# Patient Record
Sex: FEMALE | Race: OTHER | Employment: FULL TIME | URBAN - METROPOLITAN AREA
[De-identification: names, ages, dates, MRNs, and addresses within clinical notes are randomized per-mention and may not be internally consistent; named-entity substitution may affect disease eponyms.]

---

## 2017-11-16 ENCOUNTER — HOSPITAL ENCOUNTER (EMERGENCY)
Age: 47
Discharge: HOME | End: 2017-11-16
Attending: EMERGENCY MEDICINE | Admitting: EMERGENCY MEDICINE
Payer: SELF-PAY

## 2017-11-16 VITALS
TEMPERATURE: 98.3 F | DIASTOLIC BLOOD PRESSURE: 114 MMHG | RESPIRATION RATE: 16 BRPM | OXYGEN SATURATION: 100 % | BODY MASS INDEX: 30.73 KG/M2 | SYSTOLIC BLOOD PRESSURE: 167 MMHG | WEIGHT: 168 LBS | HEART RATE: 66 BPM

## 2017-11-16 DIAGNOSIS — R03.0 ELEVATED BLOOD PRESSURE READING: ICD-10-CM

## 2017-11-16 DIAGNOSIS — R10.13 ABDOMINAL PAIN, EPIGASTRIC: Primary | ICD-10-CM

## 2017-11-16 LAB
ALBUMIN SERPL-MCNC: 4.1 G/DL (ref 3.4–5)
ALBUMIN/GLOB SERPL: 1.2 {RATIO} (ref 0.8–1.7)
ALP SERPL-CCNC: 80 U/L (ref 45–117)
ALT SERPL-CCNC: 36 U/L (ref 13–56)
ANION GAP SERPL CALC-SCNC: 7 MMOL/L (ref 3–18)
APPEARANCE UR: CLEAR
AST SERPL-CCNC: 14 U/L (ref 15–37)
BASOPHILS # BLD: 0 K/UL (ref 0–0.06)
BASOPHILS NFR BLD: 0 % (ref 0–2)
BILIRUB SERPL-MCNC: 0.4 MG/DL (ref 0.2–1)
BILIRUB UR QL: NEGATIVE
BUN SERPL-MCNC: 10 MG/DL (ref 7–18)
BUN/CREAT SERPL: 13 (ref 12–20)
CALCIUM SERPL-MCNC: 9.8 MG/DL (ref 8.5–10.1)
CHLORIDE SERPL-SCNC: 103 MMOL/L (ref 100–108)
CO2 SERPL-SCNC: 28 MMOL/L (ref 21–32)
COLOR UR: YELLOW
CREAT SERPL-MCNC: 0.79 MG/DL (ref 0.6–1.3)
DIFFERENTIAL METHOD BLD: ABNORMAL
EOSINOPHIL # BLD: 0 K/UL (ref 0–0.4)
EOSINOPHIL NFR BLD: 1 % (ref 0–5)
ERYTHROCYTE [DISTWIDTH] IN BLOOD BY AUTOMATED COUNT: 12.6 % (ref 11.6–14.5)
GLOBULIN SER CALC-MCNC: 3.4 G/DL (ref 2–4)
GLUCOSE SERPL-MCNC: 100 MG/DL (ref 74–99)
GLUCOSE UR STRIP.AUTO-MCNC: NEGATIVE MG/DL
HCT VFR BLD AUTO: 46.5 % (ref 35–45)
HGB BLD-MCNC: 15.6 G/DL (ref 12–16)
HGB UR QL STRIP: NEGATIVE
KETONES UR QL STRIP.AUTO: NEGATIVE MG/DL
LEUKOCYTE ESTERASE UR QL STRIP.AUTO: NEGATIVE
LIPASE SERPL-CCNC: 134 U/L (ref 73–393)
LYMPHOCYTES # BLD: 1.7 K/UL (ref 0.9–3.6)
LYMPHOCYTES NFR BLD: 23 % (ref 21–52)
MCH RBC QN AUTO: 29.5 PG (ref 24–34)
MCHC RBC AUTO-ENTMCNC: 33.5 G/DL (ref 31–37)
MCV RBC AUTO: 87.9 FL (ref 74–97)
MONOCYTES # BLD: 0.5 K/UL (ref 0.05–1.2)
MONOCYTES NFR BLD: 6 % (ref 3–10)
NEUTS SEG # BLD: 5.4 K/UL (ref 1.8–8)
NEUTS SEG NFR BLD: 70 % (ref 40–73)
NITRITE UR QL STRIP.AUTO: NEGATIVE
PH UR STRIP: 5.5 [PH] (ref 5–8)
PLATELET # BLD AUTO: 232 K/UL (ref 135–420)
PMV BLD AUTO: 11 FL (ref 9.2–11.8)
POTASSIUM SERPL-SCNC: 3.9 MMOL/L (ref 3.5–5.5)
PROT SERPL-MCNC: 7.5 G/DL (ref 6.4–8.2)
PROT UR STRIP-MCNC: NEGATIVE MG/DL
RBC # BLD AUTO: 5.29 M/UL (ref 4.2–5.3)
SODIUM SERPL-SCNC: 138 MMOL/L (ref 136–145)
SP GR UR REFRACTOMETRY: 1.01 (ref 1–1.03)
UROBILINOGEN UR QL STRIP.AUTO: 0.2 EU/DL (ref 0.2–1)
WBC # BLD AUTO: 7.7 K/UL (ref 4.6–13.2)

## 2017-11-16 PROCEDURE — 81003 URINALYSIS AUTO W/O SCOPE: CPT | Performed by: EMERGENCY MEDICINE

## 2017-11-16 PROCEDURE — 99284 EMERGENCY DEPT VISIT MOD MDM: CPT

## 2017-11-16 PROCEDURE — 85025 COMPLETE CBC W/AUTO DIFF WBC: CPT | Performed by: EMERGENCY MEDICINE

## 2017-11-16 PROCEDURE — 74011250637 HC RX REV CODE- 250/637: Performed by: EMERGENCY MEDICINE

## 2017-11-16 PROCEDURE — 74011000250 HC RX REV CODE- 250: Performed by: EMERGENCY MEDICINE

## 2017-11-16 PROCEDURE — 80053 COMPREHEN METABOLIC PANEL: CPT | Performed by: EMERGENCY MEDICINE

## 2017-11-16 PROCEDURE — 93005 ELECTROCARDIOGRAM TRACING: CPT

## 2017-11-16 PROCEDURE — 83690 ASSAY OF LIPASE: CPT | Performed by: EMERGENCY MEDICINE

## 2017-11-16 RX ORDER — RANITIDINE 150 MG/1
150 TABLET, FILM COATED ORAL 2 TIMES DAILY
COMMUNITY
End: 2017-11-16

## 2017-11-16 RX ORDER — ONDANSETRON 4 MG/1
TABLET, ORALLY DISINTEGRATING ORAL
Qty: 10 TAB | Refills: 0 | Status: SHIPPED | OUTPATIENT
Start: 2017-11-16 | End: 2021-02-02

## 2017-11-16 RX ORDER — PANTOPRAZOLE SODIUM 40 MG/1
40 TABLET, DELAYED RELEASE ORAL DAILY
Qty: 20 TAB | Refills: 0 | Status: SHIPPED | OUTPATIENT
Start: 2017-11-16 | End: 2017-12-06

## 2017-11-16 RX ORDER — SUCRALFATE 1 G/1
1 TABLET ORAL 4 TIMES DAILY
Qty: 30 TAB | Refills: 0 | Status: SHIPPED | OUTPATIENT
Start: 2017-11-16

## 2017-11-16 RX ORDER — BISOPROLOL FUMARATE AND HYDROCHLOROTHIAZIDE 10; 6.25 MG/1; MG/1
1 TABLET ORAL DAILY
COMMUNITY
End: 2021-02-02

## 2017-11-16 RX ADMIN — PHENOBARBITAL ELIXIR 50 ML: 16.2; .1037; .0065; .0194 ELIXIR ORAL at 12:01

## 2017-11-16 NOTE — DISCHARGE INSTRUCTIONS
Abdominal Pain: Care Instructions  Your Care Instructions    Abdominal pain has many possible causes. Some aren't serious and get better on their own in a few days. Others need more testing and treatment. If your pain continues or gets worse, you need to be rechecked and may need more tests to find out what is wrong. You may need surgery to correct the problem. Don't ignore new symptoms, such as fever, nausea and vomiting, urination problems, pain that gets worse, and dizziness. These may be signs of a more serious problem. Your doctor may have recommended a follow-up visit in the next 8 to 12 hours. If you are not getting better, you may need more tests or treatment. The doctor has checked you carefully, but problems can develop later. If you notice any problems or new symptoms, get medical treatment right away. Follow-up care is a key part of your treatment and safety. Be sure to make and go to all appointments, and call your doctor if you are having problems. It's also a good idea to know your test results and keep a list of the medicines you take. How can you care for yourself at home? · Rest until you feel better. · To prevent dehydration, drink plenty of fluids, enough so that your urine is light yellow or clear like water. Choose water and other caffeine-free clear liquids until you feel better. If you have kidney, heart, or liver disease and have to limit fluids, talk with your doctor before you increase the amount of fluids you drink. · If your stomach is upset, eat mild foods, such as rice, dry toast or crackers, bananas, and applesauce. Try eating several small meals instead of two or three large ones. · Wait until 48 hours after all symptoms have gone away before you have spicy foods, alcohol, and drinks that contain caffeine. · Do not eat foods that are high in fat. · Avoid anti-inflammatory medicines such as aspirin, ibuprofen (Advil, Motrin), and naproxen (Aleve).  These can cause stomach upset. Talk to your doctor if you take daily aspirin for another health problem. When should you call for help? Call 911 anytime you think you may need emergency care. For example, call if:  ? · You passed out (lost consciousness). ? · You pass maroon or very bloody stools. ? · You vomit blood or what looks like coffee grounds. ? · You have new, severe belly pain. ?Call your doctor now or seek immediate medical care if:  ? · Your pain gets worse, especially if it becomes focused in one area of your belly. ? · You have a new or higher fever. ? · Your stools are black and look like tar, or they have streaks of blood. ? · You have unexpected vaginal bleeding. ? · You have symptoms of a urinary tract infection. These may include:  ¨ Pain when you urinate. ¨ Urinating more often than usual.  ¨ Blood in your urine. ? · You are dizzy or lightheaded, or you feel like you may faint. ? Watch closely for changes in your health, and be sure to contact your doctor if:  ? · You are not getting better after 1 day (24 hours). Where can you learn more? Go to http://thierry-bijal.info/. Enter V303 in the search box to learn more about \"Abdominal Pain: Care Instructions. \"  Current as of: March 20, 2017  Content Version: 11.4  © 4904-4805 [employer]. Care instructions adapted under license by [employer] (which disclaims liability or warranty for this information). If you have questions about a medical condition or this instruction, always ask your healthcare professional. [place] [address] any warranty or liability for your use of this information.

## 2017-11-16 NOTE — ED PROVIDER NOTES
HPI Comments: 11:58 AM Jasmeet Washington is a 52 y.o. female with h/o HTN who presents to ED complaining of epigastric pain onset 7 weeks ago. Associated symptoms include nausea and SOB due to pain. Reports no subjective fever, diarrhea, or vomiting. Epigastric pain is exacerbated with PO intake and when sleeping. The patient admits to fibroid removal surgery and hysterectomy. Denies drinking ETOH and illicit drug use. Denies taking anticoagulation medication. The patient states she does not follow with a GI specialist. Pt currently taking zantac. PCP: Marla Graham MD      The history is provided by the patient. Past Medical History:   Diagnosis Date    Hypertension        History reviewed. No pertinent surgical history. Family History:   Problem Relation Age of Onset    Hypertension Other        Social History     Social History    Marital status:      Spouse name: N/A    Number of children: N/A    Years of education: N/A     Occupational History    Not on file. Social History Main Topics    Smoking status: Never Smoker    Smokeless tobacco: Never Used    Alcohol use No    Drug use: No    Sexual activity: Not on file     Other Topics Concern    Not on file     Social History Narrative         ALLERGIES: Review of patient's allergies indicates no known allergies. Review of Systems   Constitutional: Negative for fever. Respiratory: Positive for shortness of breath (due to pain). Gastrointestinal: Positive for abdominal pain and nausea. Negative for diarrhea and vomiting. All other systems reviewed and are negative. Vitals:    11/16/17 0931   BP: (!) 167/114   Pulse: 66   Resp: 16   Temp: 98.3 °F (36.8 °C)   SpO2: 100%   Weight: 76.2 kg (168 lb)            Physical Exam   Constitutional: She is oriented to person, place, and time. She appears well-developed and well-nourished. HENT:   Head: Normocephalic and atraumatic. Neck: Neck supple. No JVD present. Cardiovascular: Normal rate and regular rhythm. Pulmonary/Chest: Effort normal and breath sounds normal. No respiratory distress. Abdominal: Soft. She exhibits no distension. There is tenderness (mild epigastric tenderness). There is no rebound, no guarding and negative Smalls's sign. Musculoskeletal: She exhibits no edema. Neurological: She is alert and oriented to person, place, and time. Skin: Skin is warm and dry. No erythema. Psychiatric: Judgment normal.        MDM  Number of Diagnoses or Management Options  Abdominal pain, epigastric:   Elevated blood pressure reading:   Diagnosis management comments: 51 y/o female presents with epigastric pain    sxs for almost 2 months. Worse with eating and at night    abd soft, non-surgical, do not feel imaging indicated   Check basic labs  Suspect gastritis vs ulcer, will give PPI, Gi follow up.      Doubt MI, due to duration of sxs       Amount and/or Complexity of Data Reviewed  Clinical lab tests: ordered and reviewed      ED Course       Procedures      Vitals:  Patient Vitals for the past 12 hrs:   Temp Pulse Resp BP SpO2   11/16/17 0931 98.3 °F (36.8 °C) 66 16 (!) 167/114 100 %       Medications ordered:   Medications   mylanta/donnatal/viscous lidocaine (GI COCKTAIL) (not administered)         Lab findings:  Recent Results (from the past 12 hour(s))   EKG, 12 LEAD, INITIAL    Collection Time: 11/16/17  9:42 AM   Result Value Ref Range    Ventricular Rate 65 BPM    Atrial Rate 65 BPM    P-R Interval 166 ms    QRS Duration 74 ms    Q-T Interval 402 ms    QTC Calculation (Bezet) 418 ms    Calculated P Axis 56 degrees    Calculated R Axis 24 degrees    Calculated T Axis 37 degrees    Diagnosis       Normal sinus rhythm  Possible Left atrial enlargement  Borderline ECG  When compared with ECG of 08-MAR-2010 10:05,  Nonspecific T wave abnormality now evident in Lateral leads     URINALYSIS W/ RFLX MICROSCOPIC    Collection Time: 11/16/17 10:45 AM   Result Value Ref Range    Color YELLOW      Appearance CLEAR      Specific gravity 1.008 1.005 - 1.030      pH (UA) 5.5 5.0 - 8.0      Protein NEGATIVE  NEG mg/dL    Glucose NEGATIVE  NEG mg/dL    Ketone NEGATIVE  NEG mg/dL    Bilirubin NEGATIVE  NEG      Blood NEGATIVE  NEG      Urobilinogen 0.2 0.2 - 1.0 EU/dL    Nitrites NEGATIVE  NEG      Leukocyte Esterase NEGATIVE  NEG     CBC WITH AUTOMATED DIFF    Collection Time: 11/16/17 11:15 AM   Result Value Ref Range    WBC 7.7 4.6 - 13.2 K/uL    RBC 5.29 4. 20 - 5.30 M/uL    HGB 15.6 12.0 - 16.0 g/dL    HCT 46.5 (H) 35.0 - 45.0 %    MCV 87.9 74.0 - 97.0 FL    MCH 29.5 24.0 - 34.0 PG    MCHC 33.5 31.0 - 37.0 g/dL    RDW 12.6 11.6 - 14.5 %    PLATELET 326 435 - 007 K/uL    MPV 11.0 9.2 - 11.8 FL    NEUTROPHILS 70 40 - 73 %    LYMPHOCYTES 23 21 - 52 %    MONOCYTES 6 3 - 10 %    EOSINOPHILS 1 0 - 5 %    BASOPHILS 0 0 - 2 %    ABS. NEUTROPHILS 5.4 1.8 - 8.0 K/UL    ABS. LYMPHOCYTES 1.7 0.9 - 3.6 K/UL    ABS. MONOCYTES 0.5 0.05 - 1.2 K/UL    ABS. EOSINOPHILS 0.0 0.0 - 0.4 K/UL    ABS. BASOPHILS 0.0 0.0 - 0.06 K/UL    DF AUTOMATED     METABOLIC PANEL, COMPREHENSIVE    Collection Time: 11/16/17 11:15 AM   Result Value Ref Range    Sodium 138 136 - 145 mmol/L    Potassium 3.9 3.5 - 5.5 mmol/L    Chloride 103 100 - 108 mmol/L    CO2 28 21 - 32 mmol/L    Anion gap 7 3.0 - 18 mmol/L    Glucose 100 (H) 74 - 99 mg/dL    BUN 10 7.0 - 18 MG/DL    Creatinine 0.79 0.6 - 1.3 MG/DL    BUN/Creatinine ratio 13 12 - 20      GFR est AA >60 >60 ml/min/1.73m2    GFR est non-AA >60 >60 ml/min/1.73m2    Calcium 9.8 8.5 - 10.1 MG/DL    Bilirubin, total 0.4 0.2 - 1.0 MG/DL    ALT (SGPT) 36 13 - 56 U/L    AST (SGOT) 14 (L) 15 - 37 U/L    Alk.  phosphatase 80 45 - 117 U/L    Protein, total 7.5 6.4 - 8.2 g/dL    Albumin 4.1 3.4 - 5.0 g/dL    Globulin 3.4 2.0 - 4.0 g/dL    A-G Ratio 1.2 0.8 - 1.7     LIPASE    Collection Time: 11/16/17 11:15 AM   Result Value Ref Range    Lipase 134 73 - 393 U/L     Progress notes, Consult notes or additional Procedure notes:   12:08 PM I have discussed the plan for discharge with the patient. She understands and agree with the plan. Pt noted to have elevated BP. Pt is asymptomatic and was referred to PCP for follow up. Disposition:  Diagnosis:   1. Abdominal pain, epigastric    2. Elevated blood pressure reading        Disposition: Discharge    Follow-up Information     Follow up With Details Comments [address] [place] [address] [address] [facility]    [facility], [address] [facility] [address] of [address] [address]  [phone]      [facility] EMERGENCY DEPT  As needed, If symptoms worsen [address] [facility] [address]. [phone]           Patient's Medications   Start Taking    ONDANSETRON (ZOFRAN ODT) 4 MG DISINTEGRATING TABLET    Take 1-2 tablets every 6-8 hours as needed for nausea and vomiting. PANTOPRAZOLE (PROTONIX) 40 MG TABLET    Take 1 Tab by mouth daily for 20 days. SUCRALFATE (CARAFATE) 1 GRAM TABLET    Take 1 Tab by mouth four (4) times daily. Continue Taking    BISOPROLOL-HYDROCHLOROTHIAZIDE (ZIAC) 10-6.25 MG PER TABLET    Take 1 Tab by mouth daily. These Medications have changed    No medications on file   Stop Taking    HYDROCODONE-ACETAMINOPHEN (VICODIN) 5-500 MG PER TABLET    Take 1 Tab by mouth every four (4) hours as needed for Pain. METOPROLOL TARTRATE PO    Take 50 mg by mouth every twelve (12) hours. PROMETHAZINE (PHENERGAN) 25 MG TABLET    Take 1 Tab by mouth every six (6) hours as needed for Nausea. RANITIDINE (ZANTAC) 150 MG TABLET    Take 150 mg by mouth two (2) times a day.          Scribe Attestation      Mary Barnes acting as a scribe for and in the presence of Bernadette French,       November 16, 2017 at 12:08 PM       Provider Attestation:      I personally performed the services described in the documentation, reviewed the documentation, as recorded by the scribe in my presence, and it accurately and completely records my words and actions.  November 16, 2017 at 12:08 PM - Joleen Gay DO

## 2017-11-16 NOTE — ED NOTES
Pt discharged home stable and ambulatory. Pain level at discharge 0  . Pt discharged with friend/family. Reviewed discharged instructions with  rx and verbalized understanding.    Patient armband removed and shredded

## 2017-11-17 LAB
ATRIAL RATE: 65 BPM
CALCULATED P AXIS, ECG09: 56 DEGREES
CALCULATED R AXIS, ECG10: 24 DEGREES
CALCULATED T AXIS, ECG11: 37 DEGREES
DIAGNOSIS, 93000: NORMAL
P-R INTERVAL, ECG05: 166 MS
Q-T INTERVAL, ECG07: 402 MS
QRS DURATION, ECG06: 74 MS
QTC CALCULATION (BEZET), ECG08: 418 MS
VENTRICULAR RATE, ECG03: 65 BPM

## 2021-01-29 ENCOUNTER — HOSPITAL ENCOUNTER (EMERGENCY)
Age: 51
Discharge: PSYCHIATRIC OR SUBSTANCE USE FACILITY | End: 2021-01-30
Attending: EMERGENCY MEDICINE

## 2021-01-29 VITALS
RESPIRATION RATE: 21 BRPM | HEART RATE: 95 BPM | OXYGEN SATURATION: 100 % | SYSTOLIC BLOOD PRESSURE: 149 MMHG | DIASTOLIC BLOOD PRESSURE: 103 MMHG | WEIGHT: 187.39 LBS | HEIGHT: 62 IN | TEMPERATURE: 97.5 F | BODY MASS INDEX: 34.48 KG/M2

## 2021-01-29 DIAGNOSIS — R45.851 SUICIDAL IDEATION: ICD-10-CM

## 2021-01-29 DIAGNOSIS — T50.902A INTENTIONAL DRUG OVERDOSE, INITIAL ENCOUNTER (HCC): Primary | ICD-10-CM

## 2021-01-29 LAB
ALBUMIN SERPL-MCNC: 5.2 G/DL (ref 3.4–5)
ALBUMIN/GLOB SERPL: 1.5 {RATIO} (ref 0.8–1.7)
ALP SERPL-CCNC: 83 U/L (ref 45–117)
ALT SERPL-CCNC: 126 U/L (ref 13–56)
AMPHET UR QL SCN: NEGATIVE
ANION GAP SERPL CALC-SCNC: 10 MMOL/L (ref 3–18)
APAP SERPL-MCNC: <2 UG/ML (ref 10–30)
APPEARANCE UR: CLEAR
AST SERPL-CCNC: 62 U/L (ref 10–38)
BARBITURATES UR QL SCN: NEGATIVE
BASOPHILS # BLD: 0 K/UL (ref 0–0.1)
BASOPHILS NFR BLD: 0 % (ref 0–2)
BENZODIAZ UR QL: NEGATIVE
BILIRUB DIRECT SERPL-MCNC: 0.3 MG/DL (ref 0–0.2)
BILIRUB SERPL-MCNC: 0.9 MG/DL (ref 0.2–1)
BILIRUB UR QL: NEGATIVE
BUN SERPL-MCNC: 8 MG/DL (ref 7–18)
BUN/CREAT SERPL: 9 (ref 12–20)
CALCIUM SERPL-MCNC: 10.2 MG/DL (ref 8.5–10.1)
CANNABINOIDS UR QL SCN: NEGATIVE
CHLORIDE SERPL-SCNC: 99 MMOL/L (ref 100–111)
CO2 SERPL-SCNC: 24 MMOL/L (ref 21–32)
COCAINE UR QL SCN: NEGATIVE
COLOR UR: YELLOW
COVID-19 RAPID TEST, COVR: NOT DETECTED
CREAT SERPL-MCNC: 0.94 MG/DL (ref 0.6–1.3)
DIFFERENTIAL METHOD BLD: ABNORMAL
EOSINOPHIL # BLD: 0 K/UL (ref 0–0.4)
EOSINOPHIL NFR BLD: 1 % (ref 0–5)
ERYTHROCYTE [DISTWIDTH] IN BLOOD BY AUTOMATED COUNT: 12.1 % (ref 11.6–14.5)
ETHANOL SERPL-MCNC: <3 MG/DL (ref 0–3)
GLOBULIN SER CALC-MCNC: 3.4 G/DL (ref 2–4)
GLUCOSE SERPL-MCNC: 118 MG/DL (ref 74–99)
GLUCOSE UR STRIP.AUTO-MCNC: NEGATIVE MG/DL
HCG UR QL: NEGATIVE
HCT VFR BLD AUTO: 48 % (ref 35–45)
HDSCOM,HDSCOM: NORMAL
HGB BLD-MCNC: 16 G/DL (ref 12–16)
HGB UR QL STRIP: NEGATIVE
KETONES UR QL STRIP.AUTO: ABNORMAL MG/DL
LEUKOCYTE ESTERASE UR QL STRIP.AUTO: NEGATIVE
LYMPHOCYTES # BLD: 2.2 K/UL (ref 0.9–3.6)
LYMPHOCYTES NFR BLD: 38 % (ref 21–52)
MCH RBC QN AUTO: 30.2 PG (ref 24–34)
MCHC RBC AUTO-ENTMCNC: 33.3 G/DL (ref 31–37)
MCV RBC AUTO: 90.6 FL (ref 74–97)
METHADONE UR QL: NEGATIVE
MONOCYTES # BLD: 0.5 K/UL (ref 0.05–1.2)
MONOCYTES NFR BLD: 8 % (ref 3–10)
NEUTS SEG # BLD: 3.1 K/UL (ref 1.8–8)
NEUTS SEG NFR BLD: 53 % (ref 40–73)
NITRITE UR QL STRIP.AUTO: NEGATIVE
OPIATES UR QL: NEGATIVE
PCP UR QL: NEGATIVE
PH UR STRIP: 5.5 [PH] (ref 5–8)
PLATELET # BLD AUTO: 247 K/UL (ref 135–420)
PMV BLD AUTO: 11.8 FL (ref 9.2–11.8)
POTASSIUM SERPL-SCNC: 3.2 MMOL/L (ref 3.5–5.5)
PROT SERPL-MCNC: 8.6 G/DL (ref 6.4–8.2)
PROT UR STRIP-MCNC: NEGATIVE MG/DL
RBC # BLD AUTO: 5.3 M/UL (ref 4.2–5.3)
SALICYLATES SERPL-MCNC: <1.7 MG/DL (ref 2.8–20)
SARS-COV-2, COV2: NORMAL
SODIUM SERPL-SCNC: 133 MMOL/L (ref 136–145)
SOURCE, COVRS: NORMAL
SP GR UR REFRACTOMETRY: <1.005 (ref 1–1.03)
UROBILINOGEN UR QL STRIP.AUTO: 0.2 EU/DL (ref 0.2–1)
WBC # BLD AUTO: 5.9 K/UL (ref 4.6–13.2)

## 2021-01-29 PROCEDURE — 82077 ASSAY SPEC XCP UR&BREATH IA: CPT

## 2021-01-29 PROCEDURE — 80143 DRUG ASSAY ACETAMINOPHEN: CPT

## 2021-01-29 PROCEDURE — 85025 COMPLETE CBC W/AUTO DIFF WBC: CPT

## 2021-01-29 PROCEDURE — 80076 HEPATIC FUNCTION PANEL: CPT

## 2021-01-29 PROCEDURE — 99285 EMERGENCY DEPT VISIT HI MDM: CPT

## 2021-01-29 PROCEDURE — 87635 SARS-COV-2 COVID-19 AMP PRB: CPT

## 2021-01-29 PROCEDURE — 81025 URINE PREGNANCY TEST: CPT

## 2021-01-29 PROCEDURE — 81003 URINALYSIS AUTO W/O SCOPE: CPT

## 2021-01-29 PROCEDURE — 80048 BASIC METABOLIC PNL TOTAL CA: CPT

## 2021-01-29 PROCEDURE — 96361 HYDRATE IV INFUSION ADD-ON: CPT

## 2021-01-29 PROCEDURE — 93005 ELECTROCARDIOGRAM TRACING: CPT

## 2021-01-29 PROCEDURE — 80307 DRUG TEST PRSMV CHEM ANLYZR: CPT

## 2021-01-29 PROCEDURE — 80179 DRUG ASSAY SALICYLATE: CPT

## 2021-01-29 PROCEDURE — 74011250636 HC RX REV CODE- 250/636: Performed by: EMERGENCY MEDICINE

## 2021-01-29 PROCEDURE — 96374 THER/PROPH/DIAG INJ IV PUSH: CPT

## 2021-01-29 RX ORDER — ONDANSETRON 2 MG/ML
4 INJECTION INTRAMUSCULAR; INTRAVENOUS
Status: COMPLETED | OUTPATIENT
Start: 2021-01-29 | End: 2021-01-29

## 2021-01-29 RX ORDER — BISOPROLOL FUMARATE AND HYDROCHLOROTHIAZIDE 10; 6.25 MG/1; MG/1
1 TABLET ORAL DAILY
COMMUNITY
End: 2021-02-02

## 2021-01-29 RX ADMIN — ONDANSETRON 4 MG: 2 INJECTION INTRAMUSCULAR; INTRAVENOUS at 18:27

## 2021-01-29 RX ADMIN — SODIUM CHLORIDE 1000 ML: 9 INJECTION, SOLUTION INTRAVENOUS at 14:28

## 2021-01-29 NOTE — ED NOTES
Pt has ambulated x  To [facility]. Sitter states that the patient has vomited. Spoke with ER provider. Medications administered as per orders.

## 2021-01-29 NOTE — ED NOTES
Spoke with Katie Dumont at [facility], will look at chart

## 2021-01-29 NOTE — CONSULTS
Name: Seble Presley   :  .70  Date:  21    Time:  1714  Location of patient: [facility]  Location of doctor: [facility]   Length of consult:   20 min    This evaluation was conducted via telepsychiatry with the assistance of onsite staff    Chief Complaint:  overdose  History of Present Illness:  50 year old  [place] female with previous psychiatric history  who presented to the ED via [facility] after overdosing on ibuprofen and penicillin.  She reports that she tried to hurt herself.  She reports she tried to drink a bunch of pills.  She reports that she was in a panic and scared.  She reports she really cannot explain why.  She reports that she has had 2-3 hours of sleep for the last month.  She reports decreased appetite and energy.  She reports no motivation.  She has been thinking about suicide for three months.  She reports she overdosed today due to the panic.  She reports when she is by herself she sometimes hears voices telling her she will always be by herself.  She reports that  the voices tell her to more and work fast or they will kill her.  She reports she gets anxious when she cannot do anything about it.  She reports she has been depressed and anxious for about 6 month.  She reports she does see thing , but is I like a glimpse when she turn to look it disappears.  She denies homicidal ideations intent sor plans  Collateral:   spoke with ED physician  SI/attempts/Self harm:   overdosed on ibuprofen  HI/Violence/Property destruction:   denies  Trauma history:  denies  Sex/human trafficking: denies  Access to guns: denies  Legal: denies   Psychiatric History/Treatment History:   she denies  Drug/Alcohol History: denies  Medical History:  HTN  Medications & Freq: bisprolol/HCTZ  10/6.25mg poq daily  Allergies:  NKDA  Family Psych History/History of suicide: denies  Social History:   [place]  Relationship status:  unknown  • Employment:  [employer] Education:  grad hs  Militarynone   Stressors/precipitants:   medical mental health   Protective factors/supports:  family  Mental Status Exam:   Appearance and attire:  she is mildly disheveled and in a hospital gown  Attitude and behavior:  calm and cooperative  Speech:  normal rate sleepy tone  Affect and mood:  restricted and depressed  Association and thought processes:  organized    Thought content: she is anxious and worried,  negativistic thoughts    Perception:   reports commend and threatening auditory hallucinations. She reports visual hallucinations  Sensorium, memory, and orientation:  sleepy  oriented times 3  Intellectual functioning: average  Insight and judgment:  limited  Impression/Risk Assessment:   48year old [place] female with no previous psychiatric history who has increased stress regarding custody of a grand child. She reports a 6 months increase in depression and anxiety. She has had decreased sleep and appetite and AH/ VH  and severe panic that lead to her overdosing after she had been thinking about it for 3 months. She is high risk and would recommend inpatient psychiatric  hospitalization. She is voluntary at this time.   If this were to change would recommend a CSB evaluation for if she meets involuntary criteria  Diagnosis:    Major depressive disorder severe recurrent with psychotic features  Treatment Recommendations:   Inpatient psychiatric hospitalization  Observation level  1:1:   Pharmacological:  celexa 10mg poq daily   Therapy:  supportive  Level of Care:  inpatient

## 2021-01-29 NOTE — ED NOTES
Pt resting on stretcher. Respirations even and non-labored. Sitter remains at bedside. Cardiac monitor continues to show NSR without ectopy.

## 2021-01-29 NOTE — ED PROVIDER NOTES
EMERGENCY DEPARTMENT HISTORY AND PHYSICAL EXAM    3:02 PM      Date: 1/29/2021  Patient Name: Horris Cranker    History of Presenting Illness     Chief Complaint   Patient presents with    Drug Overdose         History Provided By: Patient and EMS    Chief Complaint: OD  Duration:  N/A  Timing:  N/A  Location: none  Quality: none  Severity: N/A  Modifying Factors: none  Associated Symptoms: denies any other associated signs or symptoms      Additional History (Context): Horris Cranker is a 48 y.o. female with hypertension who presents with OD. Patient reportedly took a few of her pills that were in a bottle. Was 800 mg ibuprofen and also penicillin. Happened prior to arrival.  Remainder of history somewhat limited patient very tearful and upset about her grandchildren. Patient mostly is resting there is too much stress going on. Is feeling suicidal.  No other complaints. PCP: Kenia Tipton MD    Current Outpatient Medications   Medication Sig Dispense Refill    bisoprolol-hydroCHLOROthiazide [facility]) 10-6.25 mg per tablet Take 1 Tab by mouth daily.  bisoprolol-hydroCHLOROthiazide (ZIAC) 10-6.25 mg per tablet Take 1 Tab by mouth daily.  sucralfate (CARAFATE) 1 gram tablet Take 1 Tab by mouth four (4) times daily. 30 Tab 0    ondansetron (ZOFRAN ODT) 4 mg disintegrating tablet Take 1-2 tablets every 6-8 hours as needed for nausea and vomiting. 10 Tab 0       Past History     Past Medical History:  Past Medical History:   Diagnosis Date    Hypertension        Past Surgical History:  History reviewed. No pertinent surgical history.     Family History:  Family History   Problem Relation Age of Onset    Hypertension Other        Social History:  Social History     Tobacco Use    Smoking status: Never Smoker    Smokeless tobacco: Never Used   Substance Use Topics    Alcohol use: No    Drug use: No       Allergies:  No Known Allergies      Review of Systems       Review of Systems Respiratory: Negative for shortness of breath. Cardiovascular: Negative for chest pain. Gastrointestinal: Negative for abdominal pain and vomiting. Psychiatric/Behavioral: Positive for self-injury and suicidal ideas. All other systems reviewed and are negative. Physical Exam     Visit Vitals  BP (!) 152/77   Pulse 83   Temp 97.5 °F (36.4 °C)   Resp 27   Ht 5' 2\" (1.575 m)   Wt 85 kg (187 lb 6.3 oz)   SpO2 100%   BMI 34.27 kg/m²         Physical Exam  Constitutional:       Appearance: She is well-developed. HENT:      Head: Normocephalic and atraumatic. Neck:      Musculoskeletal: Neck supple. Vascular: No JVD. Cardiovascular:      Rate and Rhythm: Normal rate and regular rhythm. Pulmonary:      Effort: Pulmonary effort is normal. No respiratory distress. Breath sounds: Normal breath sounds. Abdominal:      General: There is no distension. Palpations: Abdomen is soft. Tenderness: There is no abdominal tenderness. There is no guarding or rebound. Musculoskeletal:      Comments: No joint tenderness   Skin:     General: Skin is warm and dry. Findings: No erythema. Neurological:      Mental Status: She is alert and oriented to person, place, and time.    Psychiatric:      Comments: Tearful, suicidal, depressed           Diagnostic Study Results     Labs -  Recent Results (from the past 12 hour(s))   ETHYL ALCOHOL    Collection Time: 01/29/21  2:00 PM   Result Value Ref Range    ALCOHOL(ETHYL),SERUM <3 0 - 3 MG/DL   CBC WITH AUTOMATED DIFF    Collection Time: 01/29/21  2:05 PM   Result Value Ref Range    WBC 5.9 4.6 - 13.2 K/uL    RBC 5.30 4.20 - 5.30 M/uL    HGB 16.0 12.0 - 16.0 g/dL    HCT 48.0 (H) 35.0 - 45.0 %    MCV 90.6 74.0 - 97.0 FL    MCH 30.2 24.0 - 34.0 PG    MCHC 33.3 31.0 - 37.0 g/dL    RDW 12.1 11.6 - 14.5 %    PLATELET 568 389 - 840 K/uL    MPV 11.8 9.2 - 11.8 FL    NEUTROPHILS 53 40 - 73 %    LYMPHOCYTES 38 21 - 52 %    MONOCYTES 8 3 - 10 % EOSINOPHILS 1 0 - 5 %    BASOPHILS 0 0 - 2 %    ABS. NEUTROPHILS 3.1 1.8 - 8.0 K/UL    ABS. LYMPHOCYTES 2.2 0.9 - 3.6 K/UL    ABS. MONOCYTES 0.5 0.05 - 1.2 K/UL    ABS. EOSINOPHILS 0.0 0.0 - 0.4 K/UL    ABS.  BASOPHILS 0.0 0.0 - 0.1 K/UL    DF AUTOMATED     METABOLIC PANEL, BASIC    Collection Time: 01/29/21  2:05 PM   Result Value Ref Range    Sodium 133 (L) 136 - 145 mmol/L    Potassium 3.2 (L) 3.5 - 5.5 mmol/L    Chloride 99 (L) 100 - 111 mmol/L    CO2 24 21 - 32 mmol/L    Anion gap 10 3.0 - 18 mmol/L    Glucose 118 (H) 74 - 99 mg/dL    BUN 8 7.0 - 18 MG/DL    Creatinine 0.94 0.6 - 1.3 MG/DL    BUN/Creatinine ratio 9 (L) 12 - 20      GFR est AA >60 >60 ml/min/1.73m2    GFR est non-AA >60 >60 ml/min/1.73m2    Calcium 10.2 (H) 8.5 - 10.1 MG/DL   ACETAMINOPHEN    Collection Time: 01/29/21  2:05 PM   Result Value Ref Range    Acetaminophen level <2 (L) 10.0 - 97.6 ug/mL   SALICYLATE    Collection Time: 01/29/21  2:05 PM   Result Value Ref Range    Salicylate level <5.9 (L) 2.8 - 20.0 MG/DL   EKG, 12 LEAD, INITIAL    Collection Time: 01/29/21  2:32 PM   Result Value Ref Range    Ventricular Rate 90 BPM    Atrial Rate 90 BPM    P-R Interval 146 ms    QRS Duration 80 ms    Q-T Interval 364 ms    QTC Calculation (Bezet) 445 ms    Calculated P Axis 53 degrees    Calculated R Axis 17 degrees    Calculated T Axis -4 degrees    Diagnosis       Normal sinus rhythm  Nonspecific T wave abnormality  Abnormal ECG  When compared with ECG of 16-NOV-2017 09:42,  Nonspecific T wave abnormality now evident in Anterior leads     URINALYSIS W/ RFLX MICROSCOPIC    Collection Time: 01/29/21  2:56 PM   Result Value Ref Range    Color YELLOW      Appearance CLEAR      Specific gravity <1.005 (L) 1.005 - 1.030    pH (UA) 5.5 5.0 - 8.0      Protein Negative NEG mg/dL    Glucose Negative NEG mg/dL    Ketone TRACE (A) NEG mg/dL    Bilirubin Negative NEG      Blood Negative NEG      Urobilinogen 0.2 0.2 - 1.0 EU/dL    Nitrites Negative NEG Leukocyte Esterase Negative NEG     HCG URINE, QL    Collection Time: 01/29/21  2:56 PM   Result Value Ref Range    HCG urine, QL Negative NEG     DRUG SCREEN, URINE    Collection Time: 01/29/21  2:56 PM   Result Value Ref Range    BENZODIAZEPINES Negative NEG      BARBITURATES Negative NEG      THC (TH-CANNABINOL) Negative NEG      OPIATES Negative NEG      PCP(PHENCYCLIDINE) Negative NEG      COCAINE Negative NEG      AMPHETAMINES Negative NEG      METHADONE Negative NEG      HDSCOM (NOTE)    HEPATIC FUNCTION PANEL    Collection Time: 01/29/21  3:00 PM   Result Value Ref Range    Protein, total 8.6 (H) 6.4 - 8.2 g/dL    Albumin 5.2 (H) 3.4 - 5.0 g/dL    Globulin 3.4 2.0 - 4.0 g/dL    A-G Ratio 1.5 0.8 - 1.7      Bilirubin, total 0.9 0.2 - 1.0 MG/DL    Bilirubin, direct 0.3 (H) 0.0 - 0.2 MG/DL    Alk. phosphatase 83 45 - 117 U/L    AST (SGOT) 62 (H) 10 - 38 U/L    ALT (SGPT) 126 (H) 13 - 56 U/L   SARS-COV-2    Collection Time: 01/29/21  6:45 PM   Result Value Ref Range    SARS-CoV-2 Please find results under separate order     COVID-19 RAPID TEST    Collection Time: 01/29/21  6:45 PM   Result Value Ref Range    Specimen source Nasopharyngeal      COVID-19 rapid test Not detected NOTD         Radiologic Studies -   No orders to display         Medical Decision Making   I am the first provider for this patient. I reviewed the vital signs, available nursing notes, past medical history, past surgical history, family history and social history. Vital Signs-Reviewed the patient's vital signs. Pulse Oximetry Analysis -  99 on room air (Interpretation)nl     Cardiac Monitor:  Rate: 87  Rhythm:  Normal Sinus Rhythm     EKG: Interpreted by the EP.    Time Interpreted: 8091   Rate: 90   Rhythm: Normal Sinus Rhythm    Interpretation: Normal axis, normal sinus rhythm, T wave inversion in V3, no ST changes   Comparison: 11/16/2017, new T wave inversion but otherwise no significant change    Records Reviewed: Nursing Notes, Old Medical Records and Previous electrocardiograms (Time of Review: 3:02 PM)    ED Course: Progress Notes, Reevaluation, and Consults:      Provider Notes (Medical Decision Making): 66-year-old female presenting after an overdose. Doubt serious issue but will screen for coingestions with labs, EKG. Supportive care at this time. Will consult psych. Note PD were initially here but stated patient is voluntary, if patient were to leave then she would become an ACO.    3:13 PM  Consult with poison control, no further monitoring needed at this time. 4:55 PM  Consult with telepsychiatry, she will evaluate the patient    8:59 PM  Patient has been accepted to [address] [place] [address] by Dr. David Snyder. Diagnosis     Clinical Impression:   1. Intentional drug overdose, initial encounter ([facility] [address]    2.  Suicidal ideation        Disposition: transferred to [facility]

## 2021-01-29 NOTE — ED TRIAGE NOTES
Pt arrived by E<S and police with an intentional overdose of Motrin 800 mg and Amoxicillin of unknown amount. Pt states she was trying to hurt herself and is going thru a rough time with family issues. Pt is awake and answering questions appropriately. \" Too much stress\".

## 2021-01-29 NOTE — ED NOTES
Assumed care of pt. Pt resting quietly. Pt on bedpan attempting to get urine sample. Plan of care explained to pt. Pt verbalized agreement with plan. Pt placed on cardiac monitor, NIBP, and SpO2 monitor. Cardiac monitor shows NSR without ectopy. Side rails up x 2. Stretcher locked and in low position. Call bell within reach. Sitter at bedside performing direct constant observation.

## 2021-01-30 ENCOUNTER — HOSPITAL ENCOUNTER (INPATIENT)
Age: 51
LOS: 3 days | Discharge: HOME | DRG: 885 | End: 2021-02-02
Attending: PSYCHIATRY & NEUROLOGY | Admitting: PSYCHIATRY & NEUROLOGY

## 2021-01-30 DIAGNOSIS — F33.3 DEPRESSION, MAJOR, RECURRENT, SEVERE WITH PSYCHOSIS (HCC): ICD-10-CM

## 2021-01-30 LAB
ATRIAL RATE: 90 BPM
CALCULATED P AXIS, ECG09: 53 DEGREES
CALCULATED R AXIS, ECG10: 17 DEGREES
CALCULATED T AXIS, ECG11: -4 DEGREES
DIAGNOSIS, 93000: NORMAL
P-R INTERVAL, ECG05: 146 MS
Q-T INTERVAL, ECG07: 364 MS
QRS DURATION, ECG06: 80 MS
QTC CALCULATION (BEZET), ECG08: 445 MS
VENTRICULAR RATE, ECG03: 90 BPM

## 2021-01-30 PROCEDURE — 74011250637 HC RX REV CODE- 250/637: Performed by: PSYCHIATRY & NEUROLOGY

## 2021-01-30 PROCEDURE — 99223 1ST HOSP IP/OBS HIGH 75: CPT | Performed by: PSYCHIATRY & NEUROLOGY

## 2021-01-30 PROCEDURE — 65220000003 HC RM SEMIPRIVATE PSYCH

## 2021-01-30 RX ORDER — TRAZODONE HYDROCHLORIDE 50 MG/1
50 TABLET ORAL
Status: DISCONTINUED | OUTPATIENT
Start: 2021-01-30 | End: 2021-02-02 | Stop reason: HOSPADM

## 2021-01-30 RX ORDER — HYDROXYZINE PAMOATE 25 MG/1
25 CAPSULE ORAL
Status: DISCONTINUED | OUTPATIENT
Start: 2021-01-30 | End: 2021-02-02 | Stop reason: HOSPADM

## 2021-01-30 RX ORDER — METOPROLOL SUCCINATE 100 MG/1
100 TABLET, EXTENDED RELEASE ORAL DAILY
Status: DISCONTINUED | OUTPATIENT
Start: 2021-01-30 | End: 2021-02-02 | Stop reason: HOSPADM

## 2021-01-30 RX ORDER — HYDROCHLOROTHIAZIDE 25 MG/1
6.25 TABLET ORAL DAILY
Status: DISCONTINUED | OUTPATIENT
Start: 2021-01-30 | End: 2021-02-02 | Stop reason: HOSPADM

## 2021-01-30 RX ORDER — BENZTROPINE MESYLATE 1 MG/ML
1 INJECTION INTRAMUSCULAR; INTRAVENOUS
Status: DISCONTINUED | OUTPATIENT
Start: 2021-01-30 | End: 2021-02-02 | Stop reason: HOSPADM

## 2021-01-30 RX ORDER — PAROXETINE 10 MG/1
10 TABLET, FILM COATED ORAL
Status: DISCONTINUED | OUTPATIENT
Start: 2021-01-30 | End: 2021-02-02 | Stop reason: HOSPADM

## 2021-01-30 RX ORDER — CLONIDINE HYDROCHLORIDE 0.1 MG/1
0.1 TABLET ORAL
Status: DISCONTINUED | OUTPATIENT
Start: 2021-01-30 | End: 2021-02-02 | Stop reason: HOSPADM

## 2021-01-30 RX ORDER — BENZTROPINE MESYLATE 1 MG/1
1 TABLET ORAL
Status: DISCONTINUED | OUTPATIENT
Start: 2021-01-30 | End: 2021-02-02 | Stop reason: HOSPADM

## 2021-01-30 RX ORDER — MAG HYDROX/ALUMINUM HYD/SIMETH 200-200-20
30 SUSPENSION, ORAL (FINAL DOSE FORM) ORAL
Status: DISCONTINUED | OUTPATIENT
Start: 2021-01-30 | End: 2021-02-02 | Stop reason: HOSPADM

## 2021-01-30 RX ORDER — HALOPERIDOL 5 MG/1
5 TABLET ORAL
Status: DISCONTINUED | OUTPATIENT
Start: 2021-01-30 | End: 2021-02-02 | Stop reason: HOSPADM

## 2021-01-30 RX ORDER — HALOPERIDOL 5 MG/ML
5 INJECTION INTRAMUSCULAR
Status: DISCONTINUED | OUTPATIENT
Start: 2021-01-30 | End: 2021-02-02 | Stop reason: HOSPADM

## 2021-01-30 RX ORDER — AMLODIPINE BESYLATE 5 MG/1
5 TABLET ORAL DAILY
Status: DISCONTINUED | OUTPATIENT
Start: 2021-01-30 | End: 2021-02-02 | Stop reason: HOSPADM

## 2021-01-30 RX ADMIN — HYDROCHLOROTHIAZIDE 6.25 MG: 25 TABLET ORAL at 08:09

## 2021-01-30 RX ADMIN — AMLODIPINE BESYLATE 5 MG: 5 TABLET ORAL at 15:53

## 2021-01-30 RX ADMIN — METOPROLOL SUCCINATE 100 MG: 100 TABLET, EXTENDED RELEASE ORAL at 08:09

## 2021-01-30 RX ADMIN — PAROXETINE HYDROCHLORIDE 10 MG: 10 TABLET, FILM COATED ORAL at 20:32

## 2021-01-30 RX ADMIN — CLONIDINE HYDROCHLORIDE 0.1 MG: 0.1 TABLET ORAL at 20:32

## 2021-01-30 NOTE — PROGRESS NOTES
Substitution Information per the P&T Committee approved Therapeutic Interchanges Policy    Nonformulary Medication Formulary Interchange   Bisoprolol-HCTZ [facility]) 10/6.25 mg Metoprolol succinate (TOPROL XL) 100mg daily  +  Hydrochlorothiazide 6.25 mg daily

## 2021-01-30 NOTE — BH NOTES
Blood pressure this morning was 191/126 . MD notified and pt received scheduled BP medications HCTZ 6.25 mg and metoprolol 100 mg. Recheck /112  in the left arm and /110  in the right arm. Pt states, \"I didn't take my bisoprolol for the last two days before I came to the hospital.\" This nurse obtained medication list from [facility] [address] Medications to include Norvasc 5 mg daily and Bisoprolol/Hydrochlrothiazide 10 mg/6.25 daily. Pt states, \"I don't take the norvasc. \" MD notified. Will continue to monitor.

## 2021-01-30 NOTE — GROUP NOTE
[facility] GROUP DOCUMENTATION INDIVIDUAL Group Therapy Note Date: 1/30/2021 Group Start Time: 0514 Group End Time: 1611 Group Topic: Nursing [facility] 1 SPECIAL [facility] 1 Nancy Marr [facility] GROUP DOCUMENTATION GROUP Group Therapy Note Attendees: 4 Attendance: Attended Patient's Goal:  Unit Guidelines Interventions/techniques: Challenged Follows Directions: Followed directions Interactions: Interacted appropriately Mental Status: Calm Behavior/appearance: Cooperative Goals Achieved: Able to listen to others Lukasz [place]

## 2021-01-30 NOTE — PROGRESS NOTES
Pt. arrived to the unit  From [facility] escorted by transport and Security via wheelchair for suicidal attempt by overdosing on ibuprofen and amoxicillin  with unknown amount. She alert and oriented x 4. She is cooperative with admission process. She is depressed, teary eyed with dull affect. She stated that she is been depressed since last year. She claims that on and off she hears voices but denies visual hallucination. She denies suicidal ideation at present. RN WILL DEVELOP, INITIATE, IMPLEMENT, REVIEW OR REVISE TREATMENT PLAN.

## 2021-01-30 NOTE — ROUTINE PROCESS
Chart reviewed for purpose of determining psychiatric services recommendation for disposition and further need for 1:1 sitter.

## 2021-01-30 NOTE — PROGRESS NOTES
Problem: Suicide  Goal: *STG: Remains safe in hospital  Description: Pt. will be monitor every 15 minutes for safety while in the hospital.  Outcome: Progressing Towards Goal  Goal: *STG/LTG: Complies with medication therapy  Description: Pt. will be compliant with her medication and will tell staff if drug reactions occur. Outcome: Progressing Towards Goal     Problem: Depressed Mood (Adult/Pediatric)  Goal: *LTG: Understands illness and can identify signs of relapse  Description: Pt. will be  able to cope and understands her illness  before discharge. Outcome: Progressing Towards Goal    Pt presents with dull affect, depressed mood, thought blocking, anxious at times. Pt has been withdrawn to self on the unit. Pt is participative in groups and is adherent with unit guidelines. Pt denies SI/HI at this time. Pt states, \"I don't hear voices today, but they come and go most days. \" Pt is medication compliant. Will continue to monitor.

## 2021-01-31 LAB
ALBUMIN SERPL-MCNC: 4 G/DL (ref 3.4–5)
ALBUMIN/GLOB SERPL: 1.3 {RATIO} (ref 0.8–1.7)
ALP SERPL-CCNC: 66 U/L (ref 45–117)
ALT SERPL-CCNC: 82 U/L (ref 13–56)
ANION GAP SERPL CALC-SCNC: 8 MMOL/L (ref 3–18)
AST SERPL-CCNC: 33 U/L (ref 10–38)
BILIRUB SERPL-MCNC: 0.7 MG/DL (ref 0.2–1)
BUN SERPL-MCNC: 15 MG/DL (ref 7–18)
BUN/CREAT SERPL: 10 (ref 12–20)
CALCIUM SERPL-MCNC: 9.5 MG/DL (ref 8.5–10.1)
CHLORIDE SERPL-SCNC: 109 MMOL/L (ref 100–111)
CO2 SERPL-SCNC: 23 MMOL/L (ref 21–32)
CREAT SERPL-MCNC: 1.47 MG/DL (ref 0.6–1.3)
GLOBULIN SER CALC-MCNC: 3.2 G/DL (ref 2–4)
GLUCOSE SERPL-MCNC: 73 MG/DL (ref 74–99)
POTASSIUM SERPL-SCNC: 3.5 MMOL/L (ref 3.5–5.5)
PROT SERPL-MCNC: 7.2 G/DL (ref 6.4–8.2)
SODIUM SERPL-SCNC: 140 MMOL/L (ref 136–145)

## 2021-01-31 PROCEDURE — 99231 SBSQ HOSP IP/OBS SF/LOW 25: CPT | Performed by: PSYCHIATRY & NEUROLOGY

## 2021-01-31 PROCEDURE — 74011250637 HC RX REV CODE- 250/637: Performed by: PSYCHIATRY & NEUROLOGY

## 2021-01-31 PROCEDURE — 80053 COMPREHEN METABOLIC PANEL: CPT

## 2021-01-31 PROCEDURE — 36415 COLL VENOUS BLD VENIPUNCTURE: CPT

## 2021-01-31 PROCEDURE — 86803 HEPATITIS C AB TEST: CPT

## 2021-01-31 PROCEDURE — 65220000003 HC RM SEMIPRIVATE PSYCH

## 2021-01-31 RX ORDER — PERPHENAZINE 2 MG/1
2 TABLET, FILM COATED ORAL
Status: DISCONTINUED | OUTPATIENT
Start: 2021-01-31 | End: 2021-02-02 | Stop reason: HOSPADM

## 2021-01-31 RX ADMIN — PERPHENAZINE 2 MG: 2 TABLET, FILM COATED ORAL at 20:04

## 2021-01-31 RX ADMIN — HYDROXYZINE PAMOATE 25 MG: 25 CAPSULE ORAL at 23:31

## 2021-01-31 RX ADMIN — HALOPERIDOL 5 MG: 5 TABLET ORAL at 23:31

## 2021-01-31 RX ADMIN — HYDROCHLOROTHIAZIDE 6.25 MG: 25 TABLET ORAL at 08:06

## 2021-01-31 RX ADMIN — AMLODIPINE BESYLATE 5 MG: 5 TABLET ORAL at 08:07

## 2021-01-31 RX ADMIN — TRAZODONE HYDROCHLORIDE 50 MG: 50 TABLET ORAL at 20:05

## 2021-01-31 RX ADMIN — PAROXETINE HYDROCHLORIDE 10 MG: 10 TABLET, FILM COATED ORAL at 20:05

## 2021-01-31 RX ADMIN — METOPROLOL SUCCINATE 100 MG: 100 TABLET, EXTENDED RELEASE ORAL at 08:06

## 2021-01-31 NOTE — ROUTINE PROCESS
Pt continue to have high BP  throughout the day, this evening her  BP was 180/111 and HR of 105, Dr. Lyly Correa called at 2000 updated with patient's status. Order received. Pt given prn as order, will recheck BP per facility protocol.

## 2021-01-31 NOTE — H&P
[facility] [place] AND PHYSICAL    Name:  Isela Wagoner  MR#:   [id number]  :  1970  ACCOUNT #:  [de-identified]  ADMIT DATE:  2021      IDENTIFYING INFORMATION:  The patient is a 61-year-old, , Latin female admitted after transfer from the [facility] REASON FOR ADMISSION:  The patient was admitted following intentional overdose on prescribed medications. HISTORY OF PRESENT ILLNESS:  The patient was seen during rounds and stated, \"I tried to hurt myself\" as the reason for admission. The patient further stated, \"I took a bunch of pills\" and reported having \"too much stress\" as the reason for this intentional overdose. Upon further questioning, the patient reported experiencing multiple stressors and stated \"there are a lot of things going on\" that led to her intentional overdose. Upon review of the medical record, she overdosed on penicillin and ibuprofen. The patient stated that her intent was to die. The patient declined to go into further details about her stressors. She did report sleep impairment. She further reported getting two to three hours of sleep at night. She endorsed impaired concentration, decreased appetite, depressed mood, and worry about her grandson and her son. The patient reports having lost the job working at [employer] where she has worked for eight years. She reports feeling down since no longer working at that job. It is unclear when she lost the job. 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 Upon review of the medical record, the patient is reported to have endorsed command-type auditory hallucinations that tell her to [facility] fast or they will kill her. \"  The patient reported infrequently hearing noises in her backyard. She reported investigating and denied consistently hearing things. She denied hearing others' voices or hearing unseen others speaking to her.   She denied command-type auditory hallucinations. She also denied visual hallucinations. English is the second language for the patient and it is important to note as that may have affected her overall understanding of the undersigned. The patient did not require the use of an . She did deny suicidal and homicidal ideation at the time of the interview. PAST PSYCHIATRIC HISTORY:  The patient denies previous psychiatric hospitalizations as well as outpatient treatment. She denies previous medication trials as well. MEDICAL HISTORY:  Significant for obesity and hypertension. 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    ALLERGIES:  NO KNOWN DRUG ALLERGIES. SUBSTANCE ABUSE HISTORY:  The patient denies the use of alcohol, cigarettes and illicit drugs. FAMILY HISTORY:  Significant for a son with [place]'s disease. She denied the family history of mental illness or substance abuse history. The patient does report her father having committed suicide when she was 3. SOCIAL HISTORY:  The patient was born and raised in [place] [facility] [place] [facility]. She came to the [place] in . The patient is  to her second  and has been  for 21 years. Her first   of Gregor's disease. The patient currently lives in [place] with her  and 9year-old grandson. Her highest level of education is the 12th grade. She does work in housekeeping part-time with her . She denies a history of trauma. PHYSICAL EXAMINATION:  Please refer to the physical exam performed by the nurse practitioner. VITAL SIGNS:  The patient's most recent vitals are as follows. Temperature 97.9, heart rate is 122, blood pressure is 169/110, respiratory rate 16. LABORATORY VALUES:  Significant labs are as follows:  Elevated hematocrit of 48 on CBC with diff.   Low sodium of 133, low potassium of 3.2, low chloride of 99, elevated blood sugar of 118, low BUN-to-creatinine ratio of 9, slightly elevated calcium of 41.9 on basic metabolic panel. Trace ketones on urinalysis. Negative hCG. Negative urine drug screen. Elevated ALT of 126, elevated AST of 62, elevated albumin of 5.2, elevated total protein of 8.6, slightly elevated direct bilirubin of 0.3 on hepatic function panel. Negative blood alcohol level. MENTAL STATUS EXAM:  The patient is a 49-year-old female. Noted to be overweight. She appears her stated age. The patient was dressed and groomed appropriately. She was cooperative on exam.  Her eye contact was appropriate. Her speech was normal in rate, volume and tone. There are no abnormal movements appreciated on exam.  Her stated mood was \"not bad at all. \"  Her affect was full. Her thoughts were logical and goal directed. The patient denied suicidal or homicidal ideation at the time of the interview. She denied psychosis at the time of the interview and did not appear to respond to internal stimuli on exam.  The patient was alert and oriented to person, place, time and situation. Her memory was intact as she was able to recall 3/3 objects immediately and 3/3 objects after several minutes. ADMISSION DIAGNOSIS:  Major depressive disorder, first episode, severe. INITIAL TREATMENT PLAN:  The patient will be started on Paxil 10 mg at bedtime to target her depressive symptoms. She is to remain on the inpatient unit and to engage in all aspects of treatment on the therapeutic milieu. The patient will also be started on her outpatient antihypertensive medications which were obtained from her outpatient pharmacy and include Norvasc 5 mg daily and bisoprolol/HCTZ combination pill at 10 mg and 6.25 mg. Her blood pressure will be monitored closely. It is strongly recommended that the patient be connected with outpatient therapy at the time of discharge. We will repeat her CMP to assess for continued abnormalities.     Behavioral Services  Medicare Certification Upon Admission    I certify that this patient's inpatient psychiatric hospital admission is medically necessary for:      [x] Treatment which could reasonably be expected to improve this patient's condition,       [] For diagnostic study;     AND     [x] The inpatient psychiatric services are provided while the individual is under the care of a physician and are included in the individualized plan of care. Estimated length of stay/service 3 to 4 days    Plan for post-hospital care TBD    Electronically signed by [unfilled] on 1/31/2021 at 3:35 PM       ESTIMATED LENGTH OF STAY:  Three to four days.       Susi Sanford MD      [id number]  D:  01/30/2021 15:37  T:  01/30/2021 20:55  JOB #:  [id number]

## 2021-01-31 NOTE — BH NOTES
Patient presented her self as being quiet and reserved, but cooperative and engaging when approached. During this shift, patient attended group, and was surprisingly interactive. Patient was initially quiet and withdrawn, but as the group progressed with other patients sharing their personal stories, patient became comfortable enough to discuss her personal struggles. Patient teared up as she shared situations in which she is made to feel unappreciated, embarrassed, and defeated by people in her current place of residence. Patient expressed that she has been made to feel \"stupid\" for still having her native accent by others. Patient insisted that her discussions were hypothetical so as to not criticize her family members. As group concluded, patient spent some time engaging with myself, the group facilitator, to inquire about ways to better assert her thoughts and feelings so as to not repeat the cycle that lead to her admission. Otherwise, there have been no issues to report. Patient will continue to be monitored by staff for any change in mood and behavior.

## 2021-01-31 NOTE — H&P
Psychiatry History and Physical    Subjective:     Date of Evaluation:  1/31/2021    Reason for Referral:  Evelia Chamberlain was referred to the examiners from [facility] ED for SI . History of Presenting Problem:  Evelia Chamberlain is a 49 yo F with PMH HTN, Depression who was admitted for SI. She currently denies SI, HI, delusions and hallucinations. EtOH, tox screen and rapid COVID all negative. She reports some abdominal pain and constipation. She has right calf muscle cramp. Patient Active Problem List    Diagnosis Date Noted    Depression, major, recurrent, severe with psychosis ([facility] [address] 01/30/2021     Past Medical History:   Diagnosis Date    Hypertension      No past surgical history on file. Family History   Problem Relation Age of Onset    Hypertension Other       Social History     Tobacco Use    Smoking status: Never Smoker    Smokeless tobacco: Never Used   Substance Use Topics    Alcohol use: No     Prior to Admission medications    Medication Sig Start Date End Date Taking? Authorizing Provider   bisoprolol-hydroCHLOROthiazide [place]) 10-6.25 mg per tablet Take 1 Tab by mouth daily. Other, MD Veronika   bisoprolol-hydroCHLOROthiazide [place]) 10-6.25 mg per tablet Take 1 Tab by mouth daily. Veronika Sanders MD   sucralfate (CARAFATE) 1 gram tablet Take 1 Tab by mouth four (4) times daily. 11/16/17   Mando Martinez DO   ondansetron (ZOFRAN ODT) 4 mg disintegrating tablet Take 1-2 tablets every 6-8 hours as needed for nausea and vomiting.  11/16/17   Mando Martinez DO     No Known Allergies     Review of Systems - History obtained from chart review and the patient  General ROS: negative  Psychological ROS: positive for - depression  Ophthalmic ROS: negative  ENT ROS: negative  Respiratory ROS: negative  Cardiovascular ROS: negative  Gastrointestinal ROS: positive for - abdominal pain and constipation  Musculoskeletal ROS: positive for - pain in right calf  Neurological ROS: negative  Dermatological ROS: negative      Objective:     Patient Vitals for the past 8 hrs:   BP Temp Pulse Resp   01/31/21 0752 (!) 152/98 97.8 °F (36.6 °C) 100 16       Mental Status exam: WNL except for    Sensorium  oriented to time, place and person   Orientation situation   Relations cooperative   Eye Contact appropriate   Appearance:  age appropriate   Motor Behavior:  within normal limits   Speech:  normal pitch and normal volume   Vocabulary average   Thought Process: goal directed   Thought Content free of delusions and free of hallucinations   Suicidal ideations no plan  and no intention   Homicidal ideations no plan  and no intention   Mood:  depressed   Affect:  sad   Memory recent  adequate   Memory remote:  adequate   Concentration:  adequate   Abstraction:  concrete   Insight:  fair   Reliability fair   Judgment:  fair         Physical Exam:   Visit Vitals  BP (!) 152/98 (BP 1 Location: Right upper arm, BP Patient Position: Sitting)   Pulse 100   Temp 97.8 °F (36.6 °C)   Resp 16   SpO2 100%     General appearance: alert, cooperative, no distress, appears stated age  Head: Normocephalic, without obvious abnormality, atraumatic  Eyes: negative  Ears: normal TM's and external ear canals AU  Nose: Nares normal. Septum midline. Mucosa normal. No drainage or sinus tenderness. Throat: Lips, mucosa, and tongue normal. Teeth and gums normal  Neck: supple, symmetrical, trachea midline and no adenopathy  Lungs: clear to auscultation bilaterally  Heart: regular rate and rhythm, S1, S2 normal, no murmur, click, rub or gallop  Abdomen: soft, non-tender.  Minimal tenderness to RUQ  Extremities: extremities normal, atraumatic, no cyanosis or edema  Skin: Skin color, texture, turgor normal. No rashes or lesions  Neurologic: Grossly normal        Impression:      Active Problems:    Depression, major, recurrent, severe with psychosis ([facility] [address] (1/30/2021)          Plan:     Recommendations for Treatment/Conditions:  Psychiatric treatment recommended while in hospital  Admit to inpatient psych for depressionl    Referral To:    Inpatient psychiatric care        [place], [place]   1/31/2021 2:54 PM

## 2021-01-31 NOTE — PROGRESS NOTES
[address]  Inpatient Progress Note     Date of Service: 01/31/21  Hospital Day: 1     Subjective/Interval History   01/31/21    Treatment Team Notes:  Notes reviewed and/or discussed and report that Abebe Thompson is a patient with a history of depression recently admitted to our facility after taking an overdose with multiple medications. Attention is invited to her history and physical exam which is self-explanatory. The patient was seeing my a different covering psychiatrist yesterday. I was asked to see the patient today since some concerns were raised about her having difficulties with the fact that she is not English-speaking. Today's session was conducted in [facility] Patient interview: Abebe Thompson was interviewed by this writer today. The patient remains rather depressed. Treatment with paroxetine has been restarted. In addition problems with her blood pressure being high noted. Treatment with clonidine with the necessary restrictions started. Objective     Visit Vitals  BP (!) 152/98 (BP 1 Location: Right upper arm, BP Patient Position: Sitting)   Pulse 100   Temp 97.8 °F (36.6 °C)   Resp 16   SpO2 100%     Hypertension is not well controlled yet. Recent Results (from the past 24 hour(s))   METABOLIC PANEL, COMPREHENSIVE    Collection Time: 01/31/21  7:16 AM   Result Value Ref Range    Sodium 140 136 - 145 mmol/L    Potassium 3.5 3.5 - 5.5 mmol/L    Chloride 109 100 - 111 mmol/L    CO2 23 21 - 32 mmol/L    Anion gap 8 3.0 - 18 mmol/L    Glucose 73 (L) 74 - 99 mg/dL    BUN 15 7.0 - 18 MG/DL    Creatinine 1.47 (H) 0.6 - 1.3 MG/DL    BUN/Creatinine ratio 10 (L) 12 - 20      GFR est AA 46 (L) >60 ml/min/1.73m2    GFR est non-AA 38 (L) >60 ml/min/1.73m2    Calcium 9.5 8.5 - 10.1 MG/DL    Bilirubin, total 0.7 0.2 - 1.0 MG/DL    ALT (SGPT) 82 (H) 13 - 56 U/L    AST (SGOT) 33 10 - 38 U/L    Alk.  phosphatase 66 45 - 117 U/L    Protein, total 7.2 6.4 - 8.2 g/dL Albumin 4.0 3.4 - 5.0 g/dL    Globulin 3.2 2.0 - 4.0 g/dL    A-G Ratio 1.3 0.8 - 1.7       Elevated liver function tests and kidney results are noted. The latter possibly related to her chronic history of hypertension with secondary renal injury, versus ibuprofen overdose related kidney injury. I question  why her ALT is high. The patient is moderately obese. Could be a fatty liver. With a BMI as high as 34.27 kg/m², and with the patient being described as psychotic, will suggest treatment with a first-generation antipsychotic and and not with an atypical.    Mental Status Examination     Appearance/Hygiene 48 y.o.  female  Hygiene: Fair   Behavior/Social Relatedness  withdrawn, depressed in appearance   Musculoskeletal Gait/Station: appropriate  Tone (flaccid, cogwheeling, spastic): not assessed  Psychomotor (hyperkinetic, hypokinetic): calm   Involuntary movements (tics, dyskinesias, akathisa, stereotypies): none   Speech   Rate, rhythm, volume, fluency and articulation are appropriate   Mood   depressed   Affect    flat   Thought Process Linear and goal directed   Thought Content and Perceptual Disturbances  the patient's overdose was a suicidal attempt. There appears to be some improvement in regards to the frequency and intensity of her suicidal thoughts. There is no evidence of danger to others. Auditory hallucinations described upon admission. With add with treatment with a low dose of perphenazine. Sensorium and Cognition  Grossly intact   Insight  improved   Judgment  improved        Assessment/Plan      Psychiatric Diagnoses:   Patient Active Problem List   Diagnosis Code    Depression, major, recurrent, severe with psychosis ([facility] [address] F33.3       Medical Diagnoses: Elevated liver enzymes of undetermined etiology. Elevated BUN and creatinine associated to hypertension related kidney injury versus ibuprofen related kidney injury.     Psychosocial and contextual factors: Same    Level of impairment/disability: Severe    1. Low dose of perphenazine will be added. A GI and nephrology consult should be considered. 2.  Reviewed instructions, risks, benefits and side effects of medications  3. Disposition/Discharge Date: self-care/home, to be determined.     Cole Og MD, [address] [place] [address]

## 2021-01-31 NOTE — GROUP NOTE
CARLIE  GROUP DOCUMENTATION INDIVIDUAL Group Therapy Note Date: 1/31/2021 Group Start Time: 1000 Group End Time: 3140 Group Topic: Nursing [facility] 1 ADULT CHEM Yamileth Solis RN IP  GROUP DOCUMENTATION GROUP Group Therapy Note Attendees: 2 Attendance: Did not attend Ronen Li RN

## 2021-01-31 NOTE — BH NOTES
During this period (3pm-11pm) pt has been compliant with unit rules, medication regimen and direction from staff. Pt presents with a sad and flat affect. Pt states, \"I'm just sad because I don't know what's going on with me. When I think about it, just makes me upset. \" Pt participated in groups held by staff this period and disclosed, \"cooking is one of my passions, cooking for others makes me feel good. \" Pt is reserved to herself and guarded for majority of the evening. Pt is pleasant during brief interactions with peers and members of staff. Staff will continue rounds monitoring pt for changes in behavior, location & safety.

## 2021-01-31 NOTE — BH NOTES
During this period (7am-3pm) pt has been in her room isolative for the majority of the period. Pt has been in her room guarded and reserved to self. When this writer made rounds quarterly pt observed praying quietly to self. Pt attended community group held by staff this period. Pt was able to process thoughts and feelings well. Around 1300 this writer made rounds on the pt and the pt became very nervous and frightened, making a \"shoo\" mannerism telling to me to exit the room. Pt later came and told staff, \"Sorry earlier, too much going on I didn't want anyone around me. \" Staff continued rounds monitoring pt for changes in behavior, location & safety.

## 2021-01-31 NOTE — GROUP NOTE
CARLIE  GROUP DOCUMENTATION INDIVIDUAL Group Therapy Note Date: 1/30/2021 Group Start Time: 2015 Group End Time: 2030 Group Topic: Medication [facility] 1 SPECIAL TRT 1 Anita Sagastume RN 
 
[facility] GROUP DOCUMENTATION GROUP Group Therapy Note Attendees: 6 Attendance: Attended Patient's Goal:  Importance of taking medication. Interventions/techniques: Informed Follows Directions: Followed directions Interactions: Interacted appropriately Mental Status: Calm Behavior/appearance: Cooperative Goals Achieved: Able to listen to others Additional Notes:  0 Clara George RN

## 2021-02-01 LAB
ALBUMIN SERPL-MCNC: 3.6 G/DL (ref 3.4–5)
ALBUMIN/GLOB SERPL: 1.2 {RATIO} (ref 0.8–1.7)
ALP SERPL-CCNC: 61 U/L (ref 45–117)
ALT SERPL-CCNC: 81 U/L (ref 13–56)
ANION GAP SERPL CALC-SCNC: 7 MMOL/L (ref 3–18)
AST SERPL-CCNC: 34 U/L (ref 10–38)
BILIRUB SERPL-MCNC: 0.7 MG/DL (ref 0.2–1)
BUN SERPL-MCNC: 17 MG/DL (ref 7–18)
BUN/CREAT SERPL: 15 (ref 12–20)
CALCIUM SERPL-MCNC: 9.6 MG/DL (ref 8.5–10.1)
CHLORIDE SERPL-SCNC: 103 MMOL/L (ref 100–111)
CO2 SERPL-SCNC: 27 MMOL/L (ref 21–32)
CREAT SERPL-MCNC: 1.15 MG/DL (ref 0.6–1.3)
GLOBULIN SER CALC-MCNC: 3.1 G/DL (ref 2–4)
GLUCOSE SERPL-MCNC: 74 MG/DL (ref 74–99)
HCV AB SER IA-ACNC: 0.09 INDEX
HCV AB SERPL QL IA: NEGATIVE
HCV COMMENT,HCGAC: NORMAL
POTASSIUM SERPL-SCNC: 3.2 MMOL/L (ref 3.5–5.5)
PROT SERPL-MCNC: 6.7 G/DL (ref 6.4–8.2)
SODIUM SERPL-SCNC: 137 MMOL/L (ref 136–145)

## 2021-02-01 PROCEDURE — 65220000003 HC RM SEMIPRIVATE PSYCH

## 2021-02-01 PROCEDURE — 36415 COLL VENOUS BLD VENIPUNCTURE: CPT

## 2021-02-01 PROCEDURE — 80053 COMPREHEN METABOLIC PANEL: CPT

## 2021-02-01 PROCEDURE — 74011250637 HC RX REV CODE- 250/637: Performed by: PSYCHIATRY & NEUROLOGY

## 2021-02-01 PROCEDURE — 99231 SBSQ HOSP IP/OBS SF/LOW 25: CPT | Performed by: PSYCHIATRY & NEUROLOGY

## 2021-02-01 RX ORDER — ACETAMINOPHEN 325 MG/1
650 TABLET ORAL
Status: DISCONTINUED | OUTPATIENT
Start: 2021-02-01 | End: 2021-02-02 | Stop reason: HOSPADM

## 2021-02-01 RX ADMIN — METOPROLOL SUCCINATE 100 MG: 100 TABLET, EXTENDED RELEASE ORAL at 08:07

## 2021-02-01 RX ADMIN — CLONIDINE HYDROCHLORIDE 0.1 MG: 0.1 TABLET ORAL at 19:02

## 2021-02-01 RX ADMIN — HYDROCHLOROTHIAZIDE 6.25 MG: 25 TABLET ORAL at 08:06

## 2021-02-01 RX ADMIN — PAROXETINE HYDROCHLORIDE 10 MG: 10 TABLET, FILM COATED ORAL at 20:53

## 2021-02-01 RX ADMIN — AMLODIPINE BESYLATE 5 MG: 5 TABLET ORAL at 08:06

## 2021-02-01 RX ADMIN — PERPHENAZINE 2 MG: 2 TABLET, FILM COATED ORAL at 20:53

## 2021-02-01 NOTE — PROGRESS NOTES
Problem: Suicide  Goal: *STG: Remains safe in hospital  Description: Pt. will be monitor every 15 minutes for safety while in the hospital.  Outcome: Progressing Towards Goal  Goal: *STG/LTG: Complies with medication therapy  Description: Pt. will be compliant with her medication and will tell staff if drug reactions occur. Outcome: Progressing Towards Goal     Problem: Falls - Risk of  Goal: *Absence of Falls  Description: Document Ron Morales Fall Risk and appropriate interventions in the flowsheet. Patient will remain fall free during hospital stay   Outcome: Progressing Towards Goal  Note: Fall Risk Interventions:            Medication Interventions: Teach patient to arise slowly          Pt presents with dull affect, depressed mood. Pt has been more sociable on the unit, watching television in the day area. Pt states, \"I feel much better today. My mood is good, and I don't hear the voices. \" Pt is adherent with unit guidelines. Pt denies SI/HI at this time. Pt is medication compliant. Will continue to monitor.

## 2021-02-01 NOTE — PROGRESS NOTES
Problem: Suicide  Goal: *STG: Remains safe in hospital  Description: Pt. will be monitor every 15 minutes for safety while in the hospital.  Outcome: Progressing Towards Goal  Note: Aeb pt free from harm this shift  Goal: *STG/LTG: Complies with medication therapy  Description: Pt. will be compliant with her medication and will tell staff if drug reactions occur. Outcome: Progressing Towards Goal  Note: Aeb pt taking all meds as prescribed     Problem: Falls - Risk of  Goal: *Absence of Falls  Description: Document Mehdi Fall Risk and appropriate interventions in the flowsheet. Outcome: Progressing Towards Goal  Note: Fall Risk Interventions:            Medication Interventions: Teach patient to arise slowly              Aeb pt free of falls this shift     Pt has been calm and cooperative this shift. Appears depressed. States that she is sad but denies SI/HI/AVH at this time. BP trending down, most recent this evening 131/86 all other VS WNL. Sina DERAS had extensive conversations with pt. Please see his two previous notes below.

## 2021-02-01 NOTE — BH NOTES
The patient was monitored for safety. Affect remains isolative to herself, paranoid and only social upon approach. Pt did participate in unit activities by eating all meals in the day area, talking with her SW and DR, and participated in her groups. Pt did used the phone to talk with her family. No issues with any behavioral issues. Pt did verbalize all issues. Staff will continue to monitor for safety and locations.

## 2021-02-01 NOTE — GROUP NOTE
CARLIE  GROUP DOCUMENTATION INDIVIDUAL Group Therapy Note Date: 2/1/2021 Group Start Time: 7271 Group End Time: 0830 Group Topic: Nursing [facility] 1 SPECIAL TRTMT 1 LAN [place] GROUP DOCUMENTATION GROUP Group Therapy Note Attendees: 4 Attendance: Did not attend Randalyn Gilford, RN

## 2021-02-01 NOTE — PROGRESS NOTES
[facility] [place]  Inpatient Progress Note     Date of Service: 02/01/21  Hospital Day: 2     Subjective/Interval History   02/01/21    Treatment Team Notes:  Notes reviewed and/or discussed and report that Seble Presley is a patient with a history of depression recently admitted to our facility after taking an overdose with multiple medications.  Patient is complaining of dizziness this morning.  She received Haldol 5 mg and Vistaril 25 mg around midnight last night due to complaints of anxiety.    Patient interview: Seble Presley was interviewed by this writer today.  We reviewed events leading to hospitalization.  Patient complains of depressed mood and anxiety.  She says \"there's a lot going on\".  One of her stressors are related to her son who has Gregor's disease.  She worries about him a lot as she knows that the disease is progressive.  She is also worried that her 7-year-old grandson may have the disease.  She endorses feelings of worthlessness.  She feels like she has no purpose in life.  She also feels that she is unable to express her feelings with her  because he easily gets committed 4 hours a legal hold still  Tita's mother stressed.  She has friends but does not feel comfortable discussing her stressors with them.  The patient denies suicidal ideations today.  She said suicide attempt was out of frustration but that she wants to live and be there for her family.  She is requesting to be discharged soon.    Supportive therapy was provided to enhance hopefulness in treatment.  Patient was encouraged to see the positive influenza she has on her son and grandson.  Objective     Visit Vitals  /85   Pulse 69   Temp 98.8 °F (37.1 °C)   Resp 20   SpO2 100%         Recent Results (from the past 24 hour(s))   METABOLIC PANEL, COMPREHENSIVE    Collection Time: 02/01/21 10:55 AM   Result Value Ref Range    Sodium 137 136 - 145 mmol/L    Potassium 3.2 (L) 3.5 - 5.5  mmol/L    Chloride 103 100 - 111 mmol/L    CO2 27 21 - 32 mmol/L    Anion gap 7 3.0 - 18 mmol/L    Glucose 74 74 - 99 mg/dL    BUN 17 7.0 - 18 MG/DL    Creatinine 1.15 0.6 - 1.3 MG/DL    BUN/Creatinine ratio 15 12 - 20      GFR est AA >60 >60 ml/min/1.73m2    GFR est non-AA 50 (L) >60 ml/min/1.73m2    Calcium 9.6 8.5 - 10.1 MG/DL    Bilirubin, total 0.7 0.2 - 1.0 MG/DL    ALT (SGPT) 81 (H) 13 - 56 U/L    AST (SGOT) 34 10 - 38 U/L    Alk. phosphatase 61 45 - 117 U/L    Protein, total 6.7 6.4 - 8.2 g/dL    Albumin 3.6 3.4 - 5.0 g/dL    Globulin 3.1 2.0 - 4.0 g/dL    A-G Ratio 1.2 0.8 - 1.7       Elevated liver function tests and kidney results are noted. The latter possibly related to her chronic history of hypertension with secondary renal injury, versus ibuprofen overdose related kidney injury. I question  why her ALT is high. The patient is moderately obese. Could be a fatty liver. With a BMI as high as 34.27 kg/m², and with the patient being described as psychotic, will suggest treatment with a first-generation antipsychotic and and not with an atypical.    Mental Status Examination     Appearance/Hygiene 48 y.o.  female  Hygiene: Fair   Behavior/Social Relatedness  withdrawn, depressed in appearance   Musculoskeletal Gait/Station: appropriate  Tone (flaccid, cogwheeling, spastic): not assessed  Psychomotor (hyperkinetic, hypokinetic): calm   Involuntary movements (tics, dyskinesias, akathisa, stereotypies): none   Speech   Rate, rhythm, volume, fluency and articulation are appropriate   Mood   depressed   Affect    flat   Thought Process Linear and goal directed   Thought Content and Perceptual Disturbances  the patient's overdose was a suicidal attempt. There appears to be some improvement in regards to the frequency and intensity of her suicidal thoughts. There is no evidence of danger to others. Auditory hallucinations described upon admission.   With add with treatment with a low dose of perphenazine. Sensorium and Cognition  Grossly intact   Insight  improved   Judgment  improved        Assessment/Plan      Psychiatric Diagnoses:   Patient Active Problem List   Diagnosis Code    Depression, major, recurrent, severe with psychosis ([facility] [address] F33.3       Medical Diagnoses: Elevated liver enzymes of undetermined etiology. Elevated BUN and creatinine associated to hypertension related kidney injury versus ibuprofen related kidney injury. Psychosocial and contextual factors: Same    Level of impairment/disability: Severe    1. Continue current medication regimen without changes. Repeat comprehensive metabolic panel. 2.  Reviewed instructions, risks, benefits and side effects of medications  3. Disposition/Discharge Date: self-care/home, possible discharge tomorrow. Collateral information from  may be helpful.     Felicia Lozano MD.  [facility]  Psychiatry

## 2021-02-01 NOTE — BH NOTES
At approximately 1910, patient approached me and requested to talk one to one. Patient is noted to just had gotten off the phone with her , and felt the need to inform me of her current thoughts and feelings. Patient expressed that she would like to have a meeting facilitated up here with her  so that she may assert herself and express what has been leading to her recent implosion. Patient was informed that such a request may not be possible due to Covid, but was also assured that her request would be routed up members in . Patient expressed her gratitude and agreed to comply with any recommended therapeutic interventions made.

## 2021-02-01 NOTE — BSMART NOTE
[address] [facility] Biopsychosocial Assessment Current Level of Psychosocial Functioning  
 
[x]Independent 
[]Dependent []Minimal Assist 
 
 
Comments:   
 
Psychosocial High Risk Factors (check all that apply) []Unable to obtain meds []Chronic illness/pain []Substance abuse  
[x]Lack of Family Support []Financial stress []Isolation []Inadequate [employer] [x]Suicide attempt(s) []Not taking medications []Victim of crime []Developmental Delay 
[]Unable to manage personal needs []Age 72 or older  
[]  Homeless []Shaw transportation []Readmission within 30 days []Unemployment 
[x]Traumatic Event Psychiatric Advanced Directive: None reported by patient Family to involve in treatment: Patient reports no family to involve in treatment Sexual Orientation: Patient reports she is heterosexual and . Patient Strengths: Patient is able to address the need for treatment and acknowledges the need for change. Patient Barriers: Patient reports no family assistance and states her family is currently in the [facility]. She reports the need for assistance from family with the needs of her son and grandson and states she has no support. Opiate education provided: N/A Safety plan: Patient is able to contract for safety CMHC/MH history: Depression, major, recurrent, severe with psychosis ([facility] [address] F33.3 
   
Plan of Care: 
medication management, group/individual therapies, family meetings, psycho -education, treatment team meetings to assist with stabilization Initial Discharge Plan:  Patient reports the need for outpatient therapy and reports this will be a great outlet. SW will assist with community resources upon discharge. Clinical Summary:  48year old  [facility] female with previous psychiatric history  who presented to the ED via [facility] after overdosing on ibuprofen and penicillin. She reports that she tried to hurt herself. She reports she tried to drink a bunch of pills. She reports that she was in a panic and scared. She reports she really cannot explain why. She reports that she has had 2-3 hours of sleep for the last month. She reports decreased appetite and energy. She reports no motivation. She has been thinking about suicide for three months. She reports she overdosed today due to the panic. She reports when she is by herself she sometimes hears voices telling her she will always be by herself. She reports that  the voices tell her to more and work fast or they will kill her. She reports she gets anxious when she cannot do anything about it. She reports she has been depressed and anxious for about 6 month. Patient is observed engaged in group activity. Patients affect is flat and sad. She reports a history of sadness and depression. Patient reports she is originally from the [facility] and her first marriage allowed her into the [place]. Patient reports she currently resides in [place] with 2 sons one who is the eldest has Shawano's disease. She reports this has been extremely stressful to her as he resides in a group home and watching his decline is killing her. She reports he has an 6year old son who she shares custody with his mother. Patient reports she is currently unemployed and this has caused her some stress because she is bored and has ample time to dwell in her depression. Patient currently denies SI and stated she was caught up in the moment. Patient reports she is interested in outpatient resources. SW will continue supportive counseling and will assist as needed.  
 
Heather Spear, ANA-E

## 2021-02-01 NOTE — BSMART NOTE
OCCUPATIONAL THERAPY PROGRESS NOTE Group Time:  1400 Attendance: The patient attended full group. Participation:. The patient participated with minimal elaboration in the activity. Attention:. The patient needed redirection to activity at least once. Interaction:. The patient acknowledges others or responds to questions,  with no spontaneous interaction. .Patient seemed drowsy. Minimal interaction  Unclear if could easily read activity sheet.

## 2021-02-01 NOTE — BH NOTES
Patient was awake at change of shift. Reported feeling anxious that something was going on but could not specify what it was. She was given reassure of her safety. She was given vistaril 25 mg po and haldol 5 mg po at 2331. Noted to be resting quietly with eyes closed at [facility].

## 2021-02-02 VITALS
SYSTOLIC BLOOD PRESSURE: 118 MMHG | DIASTOLIC BLOOD PRESSURE: 80 MMHG | OXYGEN SATURATION: 100 % | HEART RATE: 69 BPM | RESPIRATION RATE: 18 BRPM | TEMPERATURE: 97.7 F

## 2021-02-02 PROCEDURE — 99239 HOSP IP/OBS DSCHRG MGMT >30: CPT | Performed by: PSYCHIATRY & NEUROLOGY

## 2021-02-02 PROCEDURE — 74011250637 HC RX REV CODE- 250/637: Performed by: PSYCHIATRY & NEUROLOGY

## 2021-02-02 RX ORDER — AMLODIPINE BESYLATE 5 MG/1
5 TABLET ORAL DAILY
Qty: 30 TAB | Refills: 0 | Status: SHIPPED | OUTPATIENT
Start: 2021-02-03

## 2021-02-02 RX ORDER — PERPHENAZINE 2 MG/1
2 TABLET, FILM COATED ORAL
Qty: 30 TAB | Refills: 0 | Status: SHIPPED | OUTPATIENT
Start: 2021-02-02

## 2021-02-02 RX ORDER — METOPROLOL SUCCINATE 100 MG/1
100 TABLET, EXTENDED RELEASE ORAL DAILY
Qty: 30 TAB | Refills: 0 | Status: SHIPPED | OUTPATIENT
Start: 2021-02-03

## 2021-02-02 RX ORDER — PAROXETINE HYDROCHLORIDE 20 MG/1
20 TABLET, FILM COATED ORAL
Qty: 30 TAB | Refills: 0 | Status: SHIPPED | OUTPATIENT
Start: 2021-02-02

## 2021-02-02 RX ADMIN — AMLODIPINE BESYLATE 5 MG: 5 TABLET ORAL at 08:11

## 2021-02-02 RX ADMIN — METOPROLOL SUCCINATE 100 MG: 100 TABLET, EXTENDED RELEASE ORAL at 08:11

## 2021-02-02 RX ADMIN — HYDROCHLOROTHIAZIDE 6.25 MG: 25 TABLET ORAL at 08:11

## 2021-02-02 NOTE — PROGRESS NOTES
Reviewed discharge instructions with patient, verbalized understanding of follow up instructions. Patient declined flu vaccine.

## 2021-02-02 NOTE — GROUP NOTE
[facility] GROUP DOCUMENTATION INDIVIDUAL Group Therapy Note Date: 2/2/2021 Group Start Time: 0830 Group End Time: 1074 Group Topic: Nursing [address] 1 SPECIAL TRTMT 1 Corey Mon RN 
 
[facility] GROUP DOCUMENTATION GROUP Group Therapy Note Attendees: 5 Attendance: Attended Patient's Goal:  Medication Compliance Interventions/techniques: Informed Follows Directions: Followed directions Interactions: Interacted appropriately Mental Status: Calm Behavior/appearance: Cooperative Goals Achieved: Able to engage in interactions and Able to listen to others Nicolette Khan RN

## 2021-02-02 NOTE — BSMART NOTE
ART THERAPY GROUP PROGRESS NOTE PATIENT SCHEDULED FOR GROUP AT: 1079 ATTENDANCE: 1/2 PARTICIPATION LEVEL: Participates fully in the art process ATTENTION LEVEL : Needs occasional re-direction FOCUS: Reality orientation SYMBOLIC & THEMATIC CONTENT AS NOTED IN IMAGERY: Her mood was calm, however she appeared a bit detached. She participated in the task at hand and was concrete with approach to task. She appeared confused regarding the task directive and disconnected. At one point she stopped working on the task and put her head down on the table. She did respond when spoken to directly by the art therapy intern. Group administered by art therapy intern.

## 2021-02-02 NOTE — SUICIDE SAFETY PLAN
SAFETY PLAN    A suicide Safety Plan is a document that supports someone when they are having thoughts of suicide. Warning Signs that indicate a suicidal crisis may be developing: What (situations, thoughts, feelings, body sensations, behaviors, etc.) do you experience that lets you know you are beginning to think about suicide? 1. Depression    2. withdraw    Internal Coping Strategies:  What things can I do (relaxation techniques, hobbies, physical activities, etc.) to take my mind off my problems without contacting another person? 1. 'I like to clean house\". People and social settings that provide distraction: Who can I call or where can I go to distract me? 1. Name: family    2. Name: friends    3. Place: \" I stay at home mostly\". People whom I can ask for help: Who can I call when I need help - for example, friends, family, clergy, someone else? 1. Name:family                   Professionals or Behavioral Health agencies I can contact during a crisis: Who can I call for help - for example, my doctor, my psychiatrist, my psychologist, a mental health provider, a suicide hotline? Suicide Prevention Lifeline: 4-597-095-TALK (6601)      Making the environment safe: How can I make my environment (house/apartment/living space) safer? For example, can I remove guns, medications, and other items? Patient does not have access to any weapons.

## 2021-02-02 NOTE — DISCHARGE INSTRUCTIONS
BEHAVIORAL HEALTH NURSING DISCHARGE NOTE      The following personal items collected during your admission are returned to you:   Dental Appliance: Dental Appliances: None  Vision:    Hearing Aid:    Jewelry: Jewelry: Earrings(locker)  Clothing: Clothing: Dress, Footwear, Jacket/Coat(locker)  Other Valuables: Other Valuables: Cell Phone(locker)  Valuables sent to safe:        PATIENT INSTRUCTIONS      The discharge information has been reviewed with the patient. The patient verbalized understanding.         Patient armband removed and shredded

## 2021-02-02 NOTE — BSMART NOTE
SOCIAL WORK GROUP THERAPY PROGRESS NOTE Group Time:  10:15am   
 
Group Topic:  Coping Skills    C D Issues Group Participation:   
 
Pt moderately involved during group discussion despite her language barrier. She had flat affect & mood dysphoric most of time. Looked at the \"Process of setting Goals\", the value of a \"daily\" /  \"weekly\" schedule as tool to build self esteem, sharpen decision making skills and help define one's reality.

## 2021-02-02 NOTE — GROUP NOTE
CARLIE  GROUP DOCUMENTATION INDIVIDUAL Group Therapy Note Date: 2/1/2021 Group Start Time: 80 Group End Time: 2000 Group Topic: Medication [facility] 1 SPECIAL TRT 1 LAN Bonilla  GROUP DOCUMENTATION GROUP Group Therapy Note Attendees: 3 Attendance: Attended Interventions/techniques: Informed Follows Directions: Followed directions Interactions: Interacted appropriately Mental Status: Calm Behavior/appearance: Cooperative Goals Achieved: Able to listen to others Carley Kat RN

## 2021-02-05 NOTE — DISCHARGE SUMMARY
[facility]  Inpatient Psychiatry   Discharge Summary     Admit date: 1/30/2021    Discharge date and time: 2/2/2021  1:56 PM    Discharge Physician: Yadira Peterson MD    DISCHARGE DIAGNOSES     Psychiatric Diagnoses:   Patient Active Problem List   Diagnosis Code    Depression, major, recurrent, severe with psychosis ([facility] [address] F33.3       Level of impairment/disability:  None    HOSPITAL COURSE   IDENTIFYING INFORMATION:  The patient is a 54-year-old, , Latin female admitted after transfer from the [facility] [address]:  The patient was admitted following intentional overdose on prescribed medications.     HISTORY OF PRESENT ILLNESS:  The patient was seen during rounds and stated, \"I tried to hurt myself\" as the reason for admission. The patient further stated, \"I took a bunch of pills\" and reported having \"too much stress\" as the reason for this intentional overdose. Upon further questioning, the patient reported experiencing multiple stressors and stated \"there are a lot of things going on\" that led to her intentional overdose. Upon review of the medical record, she overdosed on penicillin and ibuprofen. The patient stated that her intent was to die. The patient declined to go into further details about her stressors. She did report sleep impairment. She further reported getting two to three hours of sleep at night. She endorsed impaired concentration, decreased appetite, depressed mood, and worry about her grandson and her son. The patient reports having lost the job working at [employer] where she has worked for eight years. She reports feeling down since no longer working at that job. It is unclear when she lost the job. 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 Upon review of the medical record, the patient is reported to have endorsed command-type auditory hallucinations that tell her to [facility] fast or they will kill her. \"  The patient reported infrequently hearing noises in her backyard. She reported investigating and denied consistently hearing things. She denied hearing others' voices or hearing unseen others speaking to her. She denied command-type auditory hallucinations. She also denied visual hallucinations. English is the second language for the patient and it is important to note as that may have affected her overall understanding of the undersigned. The patient did not require the use of an . She did deny suicidal and homicidal ideation at the time of the interview. HPI above per Dr. Jose Villatoro who was admitting attending. Hospital course: Patient admitted and monitored for suicidal ideation. She received individual, group and medication therapy. Paxil was prescribed and titrated to 20 mg daily for depression and anxiety. Trilafon 2 mg at bedtime was also added after patient complained of auditory hallucinations. Hallucinations quickly resolved and were not present at time of discharge. Patient consistently denied suicidal ideation during this hospitalization. She was remorseful of suicide attempt and said she did it out of frustration but she wants to live for her family and be there for them. She wants to make the most of the time she has with her son who has [place]'s disease. Patient was not a behavioral problem on the milieu. She was not agitated. She was compliant with treatment plan. She was also observed to have high blood pressure and was prescribed metoprolol  mg daily and amlodipine 5 mg daily. She does not have a PCP and was advised to follow-up with the [address] [place] [address] a also follow-up with the [address] [place] [address] for outpatient treatment. Safety plan was discussed. Patient was advised to call 911 should she develop suicidal thoughts again. DISPOSITION/FOLLOW-UP     Disposition: Home    Follow-up Appointments:    Follow-up Information     Follow up With Specialties Details Why Contact Keya Gutierrez MD Obstetrics & Gynecology, Gynecology, Obstetrics   [address] [facility]  [address]  [facility]           Patient will contact Amberly Dinesh Trinh on 2/8/21 @ 8:30am  Patient will complete intake via phone interview for case management and med management  Amberly Kate True Lauren, [address]          [phone] # 3: main  [phone]: fax            Patient can follow up with [facility] Department for physical Exam  [address], Janelle Saldivar [place]  [phone]  Hours are 8:15am - 5:00pm M-F    Suicide Hotline Number [phone] Help is available 24 hours a day. MEDICATION CHANGES   Outpatient medications:  No current facility-administered medications on file prior to encounter. Current Outpatient Medications on File Prior to Encounter   Medication Sig Dispense Refill    sucralfate (CARAFATE) 1 gram tablet Take 1 Tab by mouth four (4) times daily. 30 Tab 0         Discharged medication:  Discharge Medication List as of 2/2/2021 11:44 AM      START taking these medications    Details   amLODIPine (NORVASC) 5 mg tablet Take 1 Tab by mouth daily. Indications: high blood pressure, Print, Disp-30 Tab, R-0      metoprolol succinate (TOPROL-XL) 100 mg tablet Take 1 Tab by mouth daily. Indications: high blood pressure, Print, Disp-30 Tab, R-0      PARoxetine (PAXIL) 20 mg tablet Take 1 Tab by mouth nightly. Indications: major depressive disorder, Print, Disp-30 Tab, R-0      perphenazine (TRILAFON) 2 mg tablet Take 1 Tab by mouth nightly. Indications: MDD with psychotic symptoms, Print, Disp-30 Tab, R-0         CONTINUE these medications which have NOT CHANGED    Details   sucralfate (CARAFATE) 1 gram tablet Take 1 Tab by mouth four (4) times daily. , Print, Disp-30 Tab, R-0         STOP taking these medications       bisoprolol-hydroCHLOROthiazide [facility]) 10-6.25 mg per tablet Comments:   Reason for Stopping:         bisoprolol-hydroCHLOROthiazide (ZIAC) 10-6.25 mg per tablet Comments:   Reason for Stopping:         ondansetron (ZOFRAN ODT) 4 mg disintegrating tablet Comments:   Reason for Stopping:               Instructions, risks (black box warning), benefits and side effects (EPS, TD, NMS) were discussed in detail prior to discharge. Patient denied any adverse medication side effects prior to discharge. LABS/IMAGING DURING ADMISSION     Results for orders placed or performed during the hospital encounter of 31/89/79   METABOLIC PANEL, COMPREHENSIVE   Result Value Ref Range    Sodium 140 136 - 145 mmol/L    Potassium 3.5 3.5 - 5.5 mmol/L    Chloride 109 100 - 111 mmol/L    CO2 23 21 - 32 mmol/L    Anion gap 8 3.0 - 18 mmol/L    Glucose 73 (L) 74 - 99 mg/dL    BUN 15 7.0 - 18 MG/DL    Creatinine 1.47 (H) 0.6 - 1.3 MG/DL    BUN/Creatinine ratio 10 (L) 12 - 20      GFR est AA 46 (L) >60 ml/min/1.73m2    GFR est non-AA 38 (L) >60 ml/min/1.73m2    Calcium 9.5 8.5 - 10.1 MG/DL    Bilirubin, total 0.7 0.2 - 1.0 MG/DL    ALT (SGPT) 82 (H) 13 - 56 U/L    AST (SGOT) 33 10 - 38 U/L    Alk. phosphatase 66 45 - 117 U/L    Protein, total 7.2 6.4 - 8.2 g/dL    Albumin 4.0 3.4 - 5.0 g/dL    Globulin 3.2 2.0 - 4.0 g/dL    A-G Ratio 1.3 0.8 - 1.7     HEPATITIS C AB   Result Value Ref Range    Hepatitis C virus Ab 0.09 <0.80 Index    Hep C virus Ab Interp.  Negative NEG      Hep C  virus Ab comment         METABOLIC PANEL, COMPREHENSIVE   Result Value Ref Range    Sodium 137 136 - 145 mmol/L    Potassium 3.2 (L) 3.5 - 5.5 mmol/L    Chloride 103 100 - 111 mmol/L    CO2 27 21 - 32 mmol/L    Anion gap 7 3.0 - 18 mmol/L    Glucose 74 74 - 99 mg/dL    BUN 17 7.0 - 18 MG/DL    Creatinine 1.15 0.6 - 1.3 MG/DL    BUN/Creatinine ratio 15 12 - 20      GFR est AA >60 >60 ml/min/1.73m2    GFR est non-AA 50 (L) >60 ml/min/1.73m2    Calcium 9.6 8.5 - 10.1 MG/DL    Bilirubin, total 0.7 0.2 - 1.0 MG/DL    ALT (SGPT) 81 (H) 13 - 56 U/L    AST (SGOT) 34 10 - 38 U/L    Alk. phosphatase 61 45 - 117 U/L    Protein, total 6.7 6.4 - 8.2 g/dL    Albumin 3.6 3.4 - 5.0 g/dL    Globulin 3.1 2.0 - 4.0 g/dL    A-G Ratio 1.2 0.8 - 1.7          DISCHARGE MENTAL STATUS EVALUATION     Appearance/Hygiene 48 y.o.  female  Hygiene: good   Attitude/Behavior/Social Relatedness Appropriate   Musculoskeletal Gait/Station: appropriate  Tone (flaccid, cogwheeling, spastic): not assessed  Psychomotor (hyperkinetic, hypokinetic): calm  Involuntary movements (tics, dyskinesias, akathisa, stereotypies): none   Speech   Rate, rhythm, volume, fluency and articulation are appropriate   Mood   Mildly depressed   Affect    congruent   Thought Process Linear and goal directed   Thought Content and Perceptual Disturbances Denies self-injurious behavior (SIB), suicidal ideation (SI), aggressive behavior or homicidal ideation (HI)    Denies auditory and visual hallucinations   Sensorium and Cognition  Grossly intact   Insight  good   Judgment good       SUICIDE RISK ASSESSMENT     [] Admission  [x] Discharge     Key Factors:   Current admission precipitated by suicide attempt?   [x]  Yes     2    []  No     1     Suicide Attempt History  [] Past attempts of high lethality    2 []  Past attempts of low lethality    1 [x]  No previous attempts       0   Suicidal Ideation []  Constant suicidal thoughts      2 []  Intermittent or fleeting suicidal  thoughts  1 [x]  Denies current suicidal thoughts    0   Suicide Plan   []  Has plan with actual OR potential access to planned method    2 []  Has plan without access to planned method      1 [x]  No plan            0   Plan Lethality []  Highly lethal plan (Carbon monoxide, gun, hanging, jumping)    2 []  Moderate lethality of plan          1 []  Low lethality of plan (biting, head banging, superficial scratching, pillow over face)  0   Safety Plan Agreement  []  Unwilling OR unable to agree due to impaired reality testing   2   []  Patient is ambivalent and/or guarded      1 [x]  Reliably agrees        0   Current Morbid Thoughts (reunion fantasies, preoccupations with death) []  Constantly     2     []  Frequently    1 [x]  Rarely    0   Elopement Risk  []  High risk     2 []  Moderate risk    1 [x]   Low risk    0   Symptoms    []  Hopeless  []  Helpless  []  Anhedonia   []  Guilt/shame  []  Anger/rage  [x]  Anxiety  []  Insomnia   []  Agitation   []  Impulsivity  []  5-6 symptoms present    2 []  3-4 symptoms present    1  [x]  0-2 symptoms present    0     Scoring Key:  10 or higher = Imminent Risk (consider 1:1)  4 - 9 = Moderate Risk (consider q 15 minute observation)Attended alcohol, tobacco, prescription and other drug psychoeducation group.   0 - 3 = Low Risk (consider q 30 minute observation)    Total Score: 3  ------------------------------------------------------------------------------------------------------------------  PLEASE ADDRESS THE FOLLOWING 5 ISSUES     Physician's Subjective Appraisal of Risk (check one):  []  Patient replies not trustworthy: several non-verbal cues. []  Patient replies questionable: trustworthy: at least 1 non-verbal cue. [x]  Patient replies appear trustworthy. Family History of Suicide?    []  Yes  [x]  No    Protective measures (select all that apply):  [x]  Successful past responses to stress  [x]  Spiritual/Taoism beliefs  [x]  Capacity for reality testing  []  Positive therapeutic relationships  [x]  Social supports/connections  [x]  Positive coping skills  []  Frustration tolerance/optimism  [x]  Children or pets in the home  [x]  Sense of responsibility to family  [x]  Agrees to treatment plan and follow up    Others (list):    High Risk Diagnoses (select all that apply):  [x]  Depression/Bipolar Disorder  []  Dual Diagnosis  []  Cardiovascular Disease  []  Schizophrenia  []  Chronic Pain  []  Epilepsy  []  Cancer  []  Personality Disorder  [] HIV/AIDS  []  Multiple Sclerosis    Dangerousness Assessment (Suicide, homicide, property destruction. ..)    Risk Factors reviewed and risk assessed to be:  [] low  [] low-moderate  [x] moderate   [] moderate-high  [] high     Protection factors reviewed and risk assessed to be:  [x] low  [] low-moderate  [] moderate   [] moderate-high  [] high     Response to treatment and risk assessed to be:  [x] low  [] low-moderate  [] moderate   [] moderate-high  [] high     Support reviewed and risk assessed to be:  [x] low  [] low-moderate  [] moderate   [] moderate-high  [] high     Acceptance of Discharge and outpatient treatment reviewed and risk assessed to be:    [x] low  [] low-moderate  [] moderate   [] moderate-high  [] high   Overall risk assessed to be:  [x] low  [] low-moderate  [] moderate   [] moderate-high  [] high     Completion of discharge was greater than 30 minutes. Over 50% of today's discharge was geared towards counseling and coordination of care.           Genie Levin MD  Psychiatry  [facility]